# Patient Record
Sex: MALE | Race: OTHER | Employment: FULL TIME | URBAN - METROPOLITAN AREA
[De-identification: names, ages, dates, MRNs, and addresses within clinical notes are randomized per-mention and may not be internally consistent; named-entity substitution may affect disease eponyms.]

---

## 2017-02-16 ENCOUNTER — TELEPHONE (OUTPATIENT)
Dept: NEUROLOGY | Age: 27
End: 2017-02-16

## 2017-02-16 NOTE — TELEPHONE ENCOUNTER
Pt would like to stay on his current medication (keppra) instead of switching to a different one. They want to know if pt can skip his appt this months and reschedule it for august. Go back to 1 year appts vs 6 mo appts. Please call to advise.

## 2017-05-18 ENCOUNTER — TELEPHONE (OUTPATIENT)
Dept: NEUROLOGY | Age: 27
End: 2017-05-18

## 2017-05-18 NOTE — TELEPHONE ENCOUNTER
Patient's mother wanted to call with concerns regarding the appointment scheduling process. Mrs Viviane Deluna reports that it took over 20 mins on the phone to make an appointment and the  continually asked her what the appointment was regarding, even after being told several times. Patient's mother is concerned about the quality of care in the practice, if this is an indication. Mrs Viviane Deluna was reassured that we are aware of why the patient is seen by Dr Denise Portillo, and the care will be as high quality as always. Dr Denise Portillo and  made aware of concerns.   frankie

## 2017-05-18 NOTE — TELEPHONE ENCOUNTER
----- Message from Panchito Cruz sent at 5/18/2017 12:06 PM EDT -----  Regarding: Dr. Kevyn Koenig  Pt's mother(Elizabeth Simmons) would like call back to discuss son's care.  Best contact number [phone]

## 2017-08-16 RX ORDER — LEVETIRACETAM 1000 MG/1
1000 TABLET ORAL 2 TIMES DAILY
Qty: 60 TAB | Refills: 1 | Status: SHIPPED | OUTPATIENT
Start: 2017-08-16 | End: 2021-12-09

## 2020-07-30 ENCOUNTER — HOSPITAL ENCOUNTER (OUTPATIENT)
Dept: LAB | Age: 30
Discharge: HOME | End: 2020-07-30
Payer: COMMERCIAL

## 2020-07-30 DIAGNOSIS — U07.1 ASYMPTOMATIC COVID-19 VIRUS INFECTION: ICD-10-CM

## 2020-07-30 PROCEDURE — 87635 SARS-COV-2 COVID-19 AMP PRB: CPT

## 2020-07-31 LAB — SARS-COV-2, COV2NT: NOT DETECTED

## 2020-08-06 NOTE — PROGRESS NOTES
2:31 PM  Spoke with patient to verify time of arrival (10:30) and the reiterate that the patient should remain NPO after midnight.

## 2020-08-07 ENCOUNTER — HOSPITAL ENCOUNTER (OUTPATIENT)
Dept: INTERVENTIONAL RADIOLOGY/VASCULAR | Age: 30
Discharge: HOME | End: 2020-08-07
Attending: UROLOGY | Admitting: STUDENT IN AN ORGANIZED HEALTH CARE EDUCATION/TRAINING PROGRAM
Payer: COMMERCIAL

## 2020-08-07 VITALS
WEIGHT: 213.5 LBS | DIASTOLIC BLOOD PRESSURE: 68 MMHG | TEMPERATURE: 98.5 F | SYSTOLIC BLOOD PRESSURE: 121 MMHG | HEIGHT: 74 IN | OXYGEN SATURATION: 99 % | RESPIRATION RATE: 8 BRPM | BODY MASS INDEX: 27.4 KG/M2 | HEART RATE: 70 BPM

## 2020-08-07 DIAGNOSIS — N35.014 POST-TRAUMATIC MALE URETHRAL STRICTURE: ICD-10-CM

## 2020-08-07 PROCEDURE — 99152 MOD SED SAME PHYS/QHP 5/>YRS: CPT

## 2020-08-07 PROCEDURE — 77002 NEEDLE LOCALIZATION BY XRAY: CPT

## 2020-08-07 PROCEDURE — 77030005518 HC CATH URETH FOL 2W BARD -B

## 2020-08-07 PROCEDURE — 76942 ECHO GUIDE FOR BIOPSY: CPT

## 2020-08-07 PROCEDURE — 77030040831 HC BAG URINE DRNG MDII -A

## 2020-08-07 PROCEDURE — 74011250636 HC RX REV CODE- 250/636: Performed by: STUDENT IN AN ORGANIZED HEALTH CARE EDUCATION/TRAINING PROGRAM

## 2020-08-07 PROCEDURE — C1894 INTRO/SHEATH, NON-LASER: HCPCS

## 2020-08-07 PROCEDURE — 74011000250 HC RX REV CODE- 250: Performed by: STUDENT IN AN ORGANIZED HEALTH CARE EDUCATION/TRAINING PROGRAM

## 2020-08-07 PROCEDURE — C1892 INTRO/SHEATH,FIXED,PEEL-AWAY: HCPCS

## 2020-08-07 PROCEDURE — 77030011230 HC DIL VESL COON COOK -B

## 2020-08-07 PROCEDURE — 77030011229 HC DIL VESL COON COOK -A

## 2020-08-07 PROCEDURE — C1769 GUIDE WIRE: HCPCS

## 2020-08-07 RX ORDER — OXYBUTYNIN CHLORIDE 5 MG/1
5 TABLET ORAL 3 TIMES DAILY
COMMUNITY
End: 2020-12-29 | Stop reason: ALTCHOICE

## 2020-08-07 RX ORDER — VANCOMYCIN/0.9 % SOD CHLORIDE 1.5G/250ML
1500 PLASTIC BAG, INJECTION (ML) INTRAVENOUS ONCE
Status: COMPLETED | OUTPATIENT
Start: 2020-08-07 | End: 2020-08-07

## 2020-08-07 RX ORDER — CLINDAMYCIN PHOSPHATE 900 MG/50ML
900 INJECTION, SOLUTION INTRAVENOUS ONCE
Status: DISCONTINUED | OUTPATIENT
Start: 2020-08-07 | End: 2020-08-07

## 2020-08-07 RX ORDER — HYDROMORPHONE HYDROCHLORIDE 1 MG/ML
0.5 INJECTION, SOLUTION INTRAMUSCULAR; INTRAVENOUS; SUBCUTANEOUS ONCE
Status: COMPLETED | OUTPATIENT
Start: 2020-08-07 | End: 2020-08-07

## 2020-08-07 RX ORDER — LIDOCAINE HYDROCHLORIDE 10 MG/ML
10 INJECTION INFILTRATION; PERINEURAL
Status: DISCONTINUED | OUTPATIENT
Start: 2020-08-07 | End: 2020-08-07 | Stop reason: HOSPADM

## 2020-08-07 RX ORDER — MIDAZOLAM HYDROCHLORIDE 1 MG/ML
.5-5 INJECTION, SOLUTION INTRAMUSCULAR; INTRAVENOUS
Status: DISCONTINUED | OUTPATIENT
Start: 2020-08-07 | End: 2020-08-07 | Stop reason: HOSPADM

## 2020-08-07 RX ADMIN — VANCOMYCIN HYDROCHLORIDE 1500 MG: 10 INJECTION, POWDER, LYOPHILIZED, FOR SOLUTION INTRAVENOUS at 13:00

## 2020-08-07 RX ADMIN — HYDROMORPHONE HYDROCHLORIDE 0.25 MG: 1 INJECTION, SOLUTION INTRAMUSCULAR; INTRAVENOUS; SUBCUTANEOUS at 13:02

## 2020-08-07 RX ADMIN — MIDAZOLAM HYDROCHLORIDE 1 MG: 2 INJECTION, SOLUTION INTRAMUSCULAR; INTRAVENOUS at 13:03

## 2020-08-07 RX ADMIN — LIDOCAINE HYDROCHLORIDE 10 ML: 10 INJECTION, SOLUTION INFILTRATION; PERINEURAL at 13:03

## 2020-08-07 NOTE — PROGRESS NOTES
TRANSFER - IN REPORT:    Verbal report received from [facility] (name) on [employer].  being received from [facility] (unit) for ordered procedure      Report consisted of patients Situation, Background, Assessment and   Recommendations(SBAR). Information from the following report(s) SBAR was reviewed with the receiving nurse. Opportunity for questions and clarification was provided. Assessment completed upon patients arrival to unit and care assumed.

## 2020-08-07 NOTE — PROGRESS NOTES
12:43 PM  TRANSFER - OUT REPORT:    Verbal report given to Boo Fontanez RN(name) on [employer].  being transferred to [address] [place] [address] for ordered procedure       Report consisted of patients Situation, Background, Assessment and   Recommendations(SBAR). Information from the following report(s) SBAR was reviewed with the receiving nurse. Lines:   Peripheral IV 08/07/20 Left Hand (Active)        Opportunity for questions and clarification was provided.       Patient transported with:   Registered Nurse  Tech

## 2020-08-07 NOTE — PROGRESS NOTES
1323: TRANSFER - IN REPORT:    Verbal report received from [facility]ed[facility] (name) on [employer].  being received from [facility] (unit) for routine progression of care      Report consisted of patients Situation, Background, Assessment and   Recommendations(SBAR). Information from the following report(s) Procedure Summary and MAR was reviewed with the receiving nurse. Opportunity for questions and clarification was provided. Assessment completed upon patients arrival to unit and care assumed. Patient alert and oriented. Denies pain at this time. Suprapubic catheter dressing clean, dry, and intact. Urine light pink. No clots. Drainage bag hanging below patient's bladder. Will continue to monitor. 1435: 25cc of pink urine out of suprapubic catheter. Patient does not meet parameter yet to remove de leon catheter. Will recheck urine output in an hour. 1550: 275cc of pink urine out of suprapubic catheter. De Leon catheter removed. Patient tolerated catheter removal well.     1600: Discharge instructions reviewed with patient. Time given to ask questions or express concerns. Patient's mother was concerned with why patient suprapubic catheter was clamped off. Bipin Bolaños RN explained to mother that Dr. Sudhakar Rojas had instructed nurse to clamp suprapubic catheter off once patient's de leon catheter was removed in order for patient to try to void on his own and if patient needed to use the suprapubic to void after failing to void through through urethra, then he should hook up catheter bag that was supplied to him. Patient was aware of these instructions and understood them when Dr. Sudhakar Rojas was at bedside. Patient's mother was still confused on why de leon was removed and appeared to be upset. Josiahkike Pramod reached out to [facility] who had the patient earlier and inquired if she had received different orders or instructions from patient's doctor at [facility].  Mariann John did not and tried to explain Dr. Carina De La Fuente instructions again. Rohith Jones offered to contact patient's doctor at [facility] but patient's mother declined. Patient's suprapubic surgical site is dry and intact with no signs of hematoma. Removed peripheral line and telemetry. V/S stable. Patient collected all belongings. Patient discharged to mother via wheelchair.

## 2020-08-07 NOTE — PROCEDURES
Interventional Radiology  Procedure Note        8/7/2020 1:16 PM    Patient: Avis Trevino. Informed consent obtained    Diagnosis: Urethral stricture    Procedure(s): Suprapubic catheter placement, 16Fr Lopez catheter    Specimens removed:   Indwelling Lopez catheter to be removed in recovery area    Complications: None    Primary Physician: Becky Suggs MD    Recomendations: N/A    Discharge Disposition: Stable; to recovery area then home    Full dictated report to follow    Becky Suggs MD  Interventional Radiology  [facility]  1:16 PM, 8/7/2020

## 2020-08-07 NOTE — PROGRESS NOTES
TRANSFER - OUT REPORT:    Verbal report given to Darya Nino RN (name) on [employer]. being transferred to [facility] (unit) for routine progression of care       Report consisted of patient's Situation, Background, Assessment and   Recommendations(SBAR). Information from the following report(s) SBAR was reviewed with the receiving nurse. Opportunity for questions and clarification was provided.       Patient transported with:   Monitor

## 2020-08-07 NOTE — H&P
Radiology History and Physical    Patient: Denzel Hanson. 34 y.o. male       Chief Complaint: Uretheral stricture    History of Present Illness: 34year old male who suffered pelvic fractures with urethral trauma in May 2020. Has developed a urethral stricture treated with indwelling Lopez catheter. Plan for Lopez to be removed soon, and plan for suprapubic catheter to be placed in order to decompress bladder if there is recurrent urethral obstruction. Denies shortness of breath, cough, fever, chest pain, abdominal pain.     History:    Past Medical History:   Diagnosis Date    Kidney stone     Seizure ([address]     [facility]ures [facility]     White coat hypertension      Family History   Problem Relation Age of Onset    Migraines Mother      Social History     Socioeconomic History    Marital status: SINGLE     Spouse name: Not on file    Number of children: Not on file    Years of education: Not on file    Highest education level: Not on file   Occupational History    Not on file   Social Needs    Financial resource strain: Not on file    Food insecurity     Worry: Not on file     Inability: Not on file    Transportation needs     Medical: Not on file     Non-medical: Not on file   Tobacco Use    Smoking status: Never Smoker   Substance and Sexual Activity    Alcohol use: No     Comment: socially    Drug use: No    Sexual activity: Not on file   Lifestyle    Physical activity     Days per week: Not on file     Minutes per session: Not on file    Stress: Not on file   Relationships    Social connections     Talks on phone: Not on file     Gets together: Not on file     Attends [employer] service: Not on file     Active member of club or organization: Not on file     Attends meetings of clubs or organizations: Not on file     Relationship status: Not on file    Intimate partner violence     Fear of current or ex partner: Not on file     Emotionally abused: Not on file     Physically abused: Not on file Forced sexual activity: Not on file   Other Topics Concern    Not on file   Social History Narrative    ** Merged History Encounter **            Allergies: Allergies   Allergen Reactions    Fentanyl Itching     Patient stated he also became flushed all over    Pcn [Penicillins] Unknown (comments)     unk    Penicillin G Unknown (comments)       Current Medications:  Current Facility-Administered Medications   Medication Dose Route Frequency    HYDROmorphone (DILAUDID) syringe 0.5 mg  0.5 mg IntraVENous ONCE    midazolam (VERSED) injection 0.5-5 mg  0.5-5 mg IntraVENous Multiple    lidocaine (XYLOCAINE) 10 mg/mL (1 %) injection 10 mL  10 mL SubCUTAneous Multiple    clindamycin (CLEOCIN) 900mg NS 50mL IVPB (premix)  900 mg IntraVENous ONCE        Physical Exam:  Blood pressure 154/87, pulse 72, temperature 98.5 °F (36.9 °C), resp. rate 14, height 6' 2\" (1.88 m), weight 96.8 kg (213 lb 8 oz), SpO2 99 %. GENERAL: alert, cooperative, no distress, appears stated age,   LUNG: Nonlabored respiration  HEART: regular rate and rhythm, R radial & R DP pulse intact  EXT: No edema BLEs  ABD: Nontender, nondistended  No suprapubic tenderness    Alerts:      Laboratory:    No results for input(s): HGB, HCT, WBC, PLT, INR, BUN, CREA, K, CRCLT, HGBEXT, HCTEXT, PLTEXT, INREXT in the last 72 hours. No lab exists for component: PTT, PT      Plan of Care/Planned Procedure:  Risks, benefits, and alternatives reviewed with patient and he agrees to proceed with the procedure. Suprapubic catheter    Deemed appropriate for moderate sedation with versed and dilaudid. No fentanyl due to allergy.     Alan Solis MD  Interventional Radiology  [facility]  12:11 PM, 8/7/2020

## 2020-08-07 NOTE — DISCHARGE INSTRUCTIONS
Patient Education        Suprapubic Catheter Care: Care Instructions  Your Care Instructions  A suprapubic catheter is a thin tube placed into your bladder just above the pubic bone. The tube allows urine to drain out of your bladder. The urine collects in a bag attached to the tube. The bag is usually attached to your leg. Sometimes the catheter tube has a valve that lets you drain the urine into the toilet or other container. You may need a suprapubic catheter if you have nerve damage, a problem with your urinary tract, or a disease that weakens your muscles. Having a catheter for a long time increases the risk of getting a urinary tract infection. So catheter care focuses on preventing infection. Follow-up care is a key part of your treatment and safety. Be sure to make and go to all appointments, and call your doctor if you are having problems. It's also a good idea to know your test results and keep a list of the medicines you take. How can you care for yourself at home? · Wash your hands before you handle the catheter. · Clean the area around the catheter with soap and water at least one time every day. Wash the area with soap and water after every bowel movement. · Keep the drainage bag lower than your bladder to keep urine from backing up. · Clean the bag every day after removing it from the catheter. Use another container while you clean the bag. To clean the bag, fill it with 2 parts vinegar to 3 parts water and let it stand for 20 minutes. Then empty it out, and let it air dry. · Empty the drainage bag when it is full or at least every 8 hours. When should you call for help? Call your doctor now or seek immediate medical care if:  · Your catheter becomes blocked and urine does not collect in the drainage bag. · Your catheter leaks. · You have blood or pus in your urine. · You have pain in your back just below your rib cage. This is called flank pain.   · You have a fever, chills, or body aches.  · You have groin or belly pain. · Your urine is cloudy or smells bad. · You have pain, increasing redness, or bleeding around the catheter. · You have swelling around the catheter or in your belly. Watch closely for changes in your health, and be sure to contact your doctor if you have any problems. Where can you learn more? Go to http://www.gray.com/  Enter D213 in the search box to learn more about \"Suprapubic Catheter Care: Care Instructions. \"  Current as of: August 22, 2019               Content Version: 12.5  © 3705-2523 [employer]. Care instructions adapted under license by [employer] (which disclaims liability or warranty for this information). If you have questions about a medical condition or this instruction, always ask your healthcare professional. [address] any warranty or liability for your use of this information.

## 2020-08-07 NOTE — PROGRESS NOTES
10:54 AM    Patient arrived. ID and allergies verified verbally with patient. Pt voices understanding of procedure to be performed. Consent obtained. Pt prepped for procedure.        175ml drained from patient's de leon catheter  Catheter left unclamped per MD

## 2020-11-17 PROBLEM — G89.11 ACUTE PAIN DUE TO INJURY: Status: ACTIVE | Noted: 2020-05-24
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
## 2020-11-17 PROBLEM — S37.30XA URETHRAL INJURY: Status: ACTIVE | Noted: 2020-05-24

## 2020-11-17 PROBLEM — S32.10XA CLOSED FRACTURE OF SACRUM (HCC): Status: ACTIVE | Noted: 2020-05-24

## 2020-11-17 PROBLEM — S22.31XA FRACTURE OF RIB OF RIGHT SIDE: Status: ACTIVE | Noted: 2020-05-24
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
## 2020-11-17 PROBLEM — S32.9XXA PELVIS FRACTURE (HCC): Status: ACTIVE | Noted: 2020-05-24

## 2020-11-17 PROBLEM — T14.8XXA HEMATOMA: Status: ACTIVE | Noted: 2020-05-24

## 2020-11-17 PROBLEM — Z98.890 S/P CYSTOSCOPY: Status: ACTIVE | Noted: 2020-05-25

## 2021-02-16 ENCOUNTER — TRANSCRIBE ORDER (OUTPATIENT)
Dept: SCHEDULING | Age: 31
End: 2021-02-16

## 2021-02-16 ENCOUNTER — HOSPITAL ENCOUNTER (OUTPATIENT)
Dept: ULTRASOUND IMAGING | Age: 31
Discharge: HOME | End: 2021-02-16
Attending: NURSE PRACTITIONER
Payer: COMMERCIAL

## 2021-02-16 DIAGNOSIS — M79.604 PAIN IN RIGHT LEG: ICD-10-CM

## 2021-02-16 DIAGNOSIS — M79.604 PAIN IN RIGHT LEG: Primary | ICD-10-CM

## 2021-02-16 PROCEDURE — 93971 EXTREMITY STUDY: CPT

## 2021-06-29 PROBLEM — N35.919 URETHRAL STRICTURE: Status: ACTIVE | Noted: 2020-12-09

## 2021-12-08 ENCOUNTER — APPOINTMENT (OUTPATIENT)
Dept: GENERAL RADIOLOGY | Age: 31
DRG: 698 | End: 2021-12-08
Attending: EMERGENCY MEDICINE
Payer: COMMERCIAL

## 2021-12-08 ENCOUNTER — HOSPITAL ENCOUNTER (INPATIENT)
Age: 31
LOS: 1 days | Discharge: HOME | DRG: 698 | End: 2021-12-10
Attending: EMERGENCY MEDICINE | Admitting: INTERNAL MEDICINE
Payer: COMMERCIAL

## 2021-12-08 DIAGNOSIS — A41.9 SEVERE SEPSIS (HCC): ICD-10-CM

## 2021-12-08 DIAGNOSIS — R65.20 SEVERE SEPSIS (HCC): ICD-10-CM

## 2021-12-08 DIAGNOSIS — N12 PYELONEPHRITIS OF LEFT KIDNEY: ICD-10-CM

## 2021-12-08 DIAGNOSIS — R50.9 ACUTE FEBRILE ILLNESS: Primary | ICD-10-CM

## 2021-12-08 DIAGNOSIS — E87.6 HYPOKALEMIA: ICD-10-CM

## 2021-12-08 LAB
ALBUMIN SERPL-MCNC: 3.6 G/DL (ref 3.5–5)
ALBUMIN/GLOB SERPL: 0.9 {RATIO} (ref 1.1–2.2)
ALP SERPL-CCNC: 83 U/L (ref 45–117)
ALT SERPL-CCNC: 23 U/L (ref 12–78)
ANION GAP SERPL CALC-SCNC: 9 MMOL/L (ref 5–15)
AST SERPL-CCNC: 11 U/L (ref 15–37)
BILIRUB SERPL-MCNC: 0.9 MG/DL (ref 0.2–1)
BUN SERPL-MCNC: 11 MG/DL (ref 6–20)
BUN/CREAT SERPL: 8 (ref 12–20)
CALCIUM SERPL-MCNC: 8.8 MG/DL (ref 8.5–10.1)
CHLORIDE SERPL-SCNC: 105 MMOL/L (ref 97–108)
CO2 SERPL-SCNC: 20 MMOL/L (ref 21–32)
COMMENT, HOLDF: NORMAL
COVID-19 RAPID TEST, COVR: NOT DETECTED
CREAT SERPL-MCNC: 1.33 MG/DL (ref 0.7–1.3)
FLUAV AG NPH QL IA: NEGATIVE
FLUBV AG NOSE QL IA: NEGATIVE
GLOBULIN SER CALC-MCNC: 4 G/DL (ref 2–4)
GLUCOSE SERPL-MCNC: 121 MG/DL (ref 65–100)
LACTATE BLD-SCNC: 0.7 MMOL/L (ref 0.4–2)
POTASSIUM SERPL-SCNC: 3.2 MMOL/L (ref 3.5–5.1)
PROT SERPL-MCNC: 7.6 G/DL (ref 6.4–8.2)
SAMPLES BEING HELD,HOLD: NORMAL
SODIUM SERPL-SCNC: 134 MMOL/L (ref 136–145)
SOURCE, COVRS: NORMAL

## 2021-12-08 PROCEDURE — 96374 THER/PROPH/DIAG INJ IV PUSH: CPT

## 2021-12-08 PROCEDURE — 87635 SARS-COV-2 COVID-19 AMP PRB: CPT

## 2021-12-08 PROCEDURE — 83605 ASSAY OF LACTIC ACID: CPT

## 2021-12-08 PROCEDURE — 80053 COMPREHEN METABOLIC PANEL: CPT

## 2021-12-08 PROCEDURE — 71046 X-RAY EXAM CHEST 2 VIEWS: CPT

## 2021-12-08 PROCEDURE — 85652 RBC SED RATE AUTOMATED: CPT

## 2021-12-08 PROCEDURE — 36415 COLL VENOUS BLD VENIPUNCTURE: CPT

## 2021-12-08 PROCEDURE — 84145 PROCALCITONIN (PCT): CPT

## 2021-12-08 PROCEDURE — 74011250637 HC RX REV CODE- 250/637: Performed by: EMERGENCY MEDICINE

## 2021-12-08 PROCEDURE — 99284 EMERGENCY DEPT VISIT MOD MDM: CPT

## 2021-12-08 PROCEDURE — 87086 URINE CULTURE/COLONY COUNT: CPT

## 2021-12-08 PROCEDURE — 87040 BLOOD CULTURE FOR BACTERIA: CPT

## 2021-12-08 PROCEDURE — 83735 ASSAY OF MAGNESIUM: CPT

## 2021-12-08 PROCEDURE — 81001 URINALYSIS AUTO W/SCOPE: CPT

## 2021-12-08 PROCEDURE — 74011000250 HC RX REV CODE- 250: Performed by: EMERGENCY MEDICINE

## 2021-12-08 PROCEDURE — 85025 COMPLETE CBC W/AUTO DIFF WBC: CPT

## 2021-12-08 PROCEDURE — 86140 C-REACTIVE PROTEIN: CPT

## 2021-12-08 PROCEDURE — 96361 HYDRATE IV INFUSION ADD-ON: CPT

## 2021-12-08 PROCEDURE — 87804 INFLUENZA ASSAY W/OPTIC: CPT

## 2021-12-08 PROCEDURE — 74011250636 HC RX REV CODE- 250/636: Performed by: EMERGENCY MEDICINE

## 2021-12-08 RX ORDER — KETOROLAC TROMETHAMINE 30 MG/ML
30 INJECTION, SOLUTION INTRAMUSCULAR; INTRAVENOUS
Status: COMPLETED | OUTPATIENT
Start: 2021-12-08 | End: 2021-12-08

## 2021-12-08 RX ORDER — ACETAMINOPHEN 500 MG
1000 TABLET ORAL
Status: COMPLETED | OUTPATIENT
Start: 2021-12-08 | End: 2021-12-08

## 2021-12-08 RX ADMIN — ACETAMINOPHEN 1000 MG: 500 TABLET ORAL at 22:48

## 2021-12-08 RX ADMIN — SODIUM CHLORIDE 1000 ML: 9 INJECTION, SOLUTION INTRAVENOUS at 22:46

## 2021-12-08 RX ADMIN — KETOROLAC TROMETHAMINE 30 MG: 30 INJECTION, SOLUTION INTRAMUSCULAR; INTRAVENOUS at 22:48

## 2021-12-08 RX ADMIN — CEFTRIAXONE 1 G: 1 INJECTION, POWDER, FOR SOLUTION INTRAMUSCULAR; INTRAVENOUS at 22:52

## 2021-12-09 ENCOUNTER — APPOINTMENT (OUTPATIENT)
Dept: CT IMAGING | Age: 31
DRG: 698 | End: 2021-12-09
Attending: EMERGENCY MEDICINE
Payer: COMMERCIAL

## 2021-12-09 PROBLEM — S37.30XA URETHRAL INJURY: Status: RESOLVED | Noted: 2020-05-24 | Resolved: 2021-12-09

## 2021-12-09 PROBLEM — D72.829 LEUKOCYTOSIS: Status: ACTIVE | Noted: 2021-12-09

## 2021-12-09 PROBLEM — T14.8XXA HEMATOMA: Status: RESOLVED | Noted: 2020-05-24 | Resolved: 2021-12-09

## 2021-12-09 PROBLEM — S32.9XXA PELVIS FRACTURE (HCC): Status: RESOLVED | Noted: 2020-05-24 | Resolved: 2021-12-09

## 2021-12-09 PROBLEM — R00.0 SINUS TACHYCARDIA: Status: ACTIVE | Noted: 2021-12-09
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
## 2021-12-09 PROBLEM — G89.11 ACUTE PAIN DUE TO INJURY: Status: RESOLVED | Noted: 2020-05-24 | Resolved: 2021-12-09
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
## 2021-12-09 PROBLEM — S22.31XA FRACTURE OF RIB OF RIGHT SIDE: Status: RESOLVED | Noted: 2020-05-24 | Resolved: 2021-12-09

## 2021-12-09 PROBLEM — S32.10XA CLOSED FRACTURE OF SACRUM (HCC): Status: RESOLVED | Noted: 2020-05-24 | Resolved: 2021-12-09

## 2021-12-09 PROBLEM — R50.9 FEVER: Status: ACTIVE | Noted: 2021-12-09

## 2021-12-09 PROBLEM — N35.919 URETHRAL STRICTURE: Status: RESOLVED | Noted: 2020-12-09 | Resolved: 2021-12-09

## 2021-12-09 PROBLEM — A41.9 SEPSIS (HCC): Status: ACTIVE | Noted: 2021-12-09

## 2021-12-09 LAB
ALBUMIN SERPL-MCNC: 2.8 G/DL (ref 3.5–5)
ALBUMIN/GLOB SERPL: 0.8 {RATIO} (ref 1.1–2.2)
ALP SERPL-CCNC: 68 U/L (ref 45–117)
ALT SERPL-CCNC: 18 U/L (ref 12–78)
ANION GAP SERPL CALC-SCNC: 8 MMOL/L (ref 5–15)
APPEARANCE UR: ABNORMAL
AST SERPL-CCNC: 11 U/L (ref 15–37)
BACTERIA URNS QL MICRO: NEGATIVE /HPF
BASOPHILS # BLD: 0 K/UL (ref 0–0.1)
BASOPHILS # BLD: 0 K/UL (ref 0–0.1)
BASOPHILS NFR BLD: 0 % (ref 0–1)
BASOPHILS NFR BLD: 0 % (ref 0–1)
BILIRUB SERPL-MCNC: 0.5 MG/DL (ref 0.2–1)
BILIRUB UR QL: NEGATIVE
BUN SERPL-MCNC: 11 MG/DL (ref 6–20)
BUN/CREAT SERPL: 13 (ref 12–20)
C DIFF TOX GENS STL QL NAA+PROBE: POSITIVE
CALCIUM SERPL-MCNC: 8.3 MG/DL (ref 8.5–10.1)
CHLORIDE SERPL-SCNC: 112 MMOL/L (ref 97–108)
CO2 SERPL-SCNC: 19 MMOL/L (ref 21–32)
COLOR UR: ABNORMAL
CREAT SERPL-MCNC: 0.85 MG/DL (ref 0.7–1.3)
CRP SERPL-MCNC: 12.1 MG/DL (ref 0–0.6)
DIFFERENTIAL METHOD BLD: ABNORMAL
DIFFERENTIAL METHOD BLD: ABNORMAL
EOSINOPHIL # BLD: 0 K/UL (ref 0–0.4)
EOSINOPHIL # BLD: 0 K/UL (ref 0–0.4)
EOSINOPHIL NFR BLD: 0 % (ref 0–7)
EOSINOPHIL NFR BLD: 0 % (ref 0–7)
EPITH CASTS URNS QL MICRO: ABNORMAL /LPF
ERYTHROCYTE [DISTWIDTH] IN BLOOD BY AUTOMATED COUNT: 12.3 % (ref 11.5–14.5)
ERYTHROCYTE [DISTWIDTH] IN BLOOD BY AUTOMATED COUNT: 12.4 % (ref 11.5–14.5)
ERYTHROCYTE [SEDIMENTATION RATE] IN BLOOD: 7 MM/HR (ref 0–15)
GLOBULIN SER CALC-MCNC: 3.6 G/DL (ref 2–4)
GLUCOSE SERPL-MCNC: 99 MG/DL (ref 65–100)
GLUCOSE UR STRIP.AUTO-MCNC: NEGATIVE MG/DL
HCT VFR BLD AUTO: 41.6 % (ref 36.6–50.3)
HCT VFR BLD AUTO: 46.3 % (ref 36.6–50.3)
HGB BLD-MCNC: 14.4 G/DL (ref 12.1–17)
HGB BLD-MCNC: 16.2 G/DL (ref 12.1–17)
HGB UR QL STRIP: ABNORMAL
HYALINE CASTS URNS QL MICRO: ABNORMAL /LPF (ref 0–5)
IMM GRANULOCYTES # BLD AUTO: 0 K/UL (ref 0–0.04)
IMM GRANULOCYTES # BLD AUTO: 0.1 K/UL (ref 0–0.04)
IMM GRANULOCYTES NFR BLD AUTO: 0 % (ref 0–0.5)
IMM GRANULOCYTES NFR BLD AUTO: 1 % (ref 0–0.5)
INTERPRETATION: ABNORMAL
KETONES UR QL STRIP.AUTO: ABNORMAL MG/DL
LEUKOCYTE ESTERASE UR QL STRIP.AUTO: ABNORMAL
LYMPHOCYTES # BLD: 1.4 K/UL (ref 0.8–3.5)
LYMPHOCYTES # BLD: 1.5 K/UL (ref 0.8–3.5)
LYMPHOCYTES NFR BLD: 11 % (ref 12–49)
LYMPHOCYTES NFR BLD: 8 % (ref 12–49)
MAGNESIUM SERPL-MCNC: 1.8 MG/DL (ref 1.6–2.4)
MCH RBC QN AUTO: 29.8 PG (ref 26–34)
MCH RBC QN AUTO: 30.4 PG (ref 26–34)
MCHC RBC AUTO-ENTMCNC: 34.6 G/DL (ref 30–36.5)
MCHC RBC AUTO-ENTMCNC: 35 G/DL (ref 30–36.5)
MCV RBC AUTO: 85.3 FL (ref 80–99)
MCV RBC AUTO: 87.9 FL (ref 80–99)
MONOCYTES # BLD: 2.2 K/UL (ref 0–1)
MONOCYTES # BLD: 2.5 K/UL (ref 0–1)
MONOCYTES NFR BLD: 14 % (ref 5–13)
MONOCYTES NFR BLD: 16 % (ref 5–13)
NEUTS SEG # BLD: 14.2 K/UL (ref 1.8–8)
NEUTS SEG # BLD: 9.8 K/UL (ref 1.8–8)
NEUTS SEG NFR BLD: 72 % (ref 32–75)
NEUTS SEG NFR BLD: 78 % (ref 32–75)
NITRITE UR QL STRIP.AUTO: NEGATIVE
NRBC # BLD: 0 K/UL (ref 0–0.01)
NRBC # BLD: 0 K/UL (ref 0–0.01)
NRBC BLD-RTO: 0 PER 100 WBC
NRBC BLD-RTO: 0 PER 100 WBC
OTHER,OTHU: ABNORMAL
PCR REFLEX: ABNORMAL
PH UR STRIP: 6 [PH] (ref 5–8)
PLATELET # BLD AUTO: 202 K/UL (ref 150–400)
PLATELET # BLD AUTO: 246 K/UL (ref 150–400)
PMV BLD AUTO: 9.3 FL (ref 8.9–12.9)
PMV BLD AUTO: 9.8 FL (ref 8.9–12.9)
POTASSIUM SERPL-SCNC: 3.6 MMOL/L (ref 3.5–5.1)
PROCALCITONIN SERPL-MCNC: 0.28 NG/ML
PROT SERPL-MCNC: 6.4 G/DL (ref 6.4–8.2)
PROT UR STRIP-MCNC: 300 MG/DL
RBC # BLD AUTO: 4.73 M/UL (ref 4.1–5.7)
RBC # BLD AUTO: 5.43 M/UL (ref 4.1–5.7)
RBC #/AREA URNS HPF: >100 /HPF (ref 0–5)
RBC MORPH BLD: ABNORMAL
RBC MORPH BLD: ABNORMAL
SODIUM SERPL-SCNC: 139 MMOL/L (ref 136–145)
SP GR UR REFRACTOMETRY: 1.02 (ref 1–1.03)
UA: UC IF INDICATED,UAUC: ABNORMAL
UROBILINOGEN UR QL STRIP.AUTO: 0.2 EU/DL (ref 0.2–1)
WBC # BLD AUTO: 13.6 K/UL (ref 4.1–11.1)
WBC # BLD AUTO: 18.1 K/UL (ref 4.1–11.1)
WBC URNS QL MICRO: ABNORMAL /HPF (ref 0–4)

## 2021-12-09 PROCEDURE — 74177 CT ABD & PELVIS W/CONTRAST: CPT

## 2021-12-09 PROCEDURE — 80053 COMPREHEN METABOLIC PANEL: CPT

## 2021-12-09 PROCEDURE — 96361 HYDRATE IV INFUSION ADD-ON: CPT

## 2021-12-09 PROCEDURE — 74011250637 HC RX REV CODE- 250/637: Performed by: INTERNAL MEDICINE

## 2021-12-09 PROCEDURE — 85025 COMPLETE CBC W/AUTO DIFF WBC: CPT

## 2021-12-09 PROCEDURE — 74011250636 HC RX REV CODE- 250/636: Performed by: EMERGENCY MEDICINE

## 2021-12-09 PROCEDURE — 65270000029 HC RM PRIVATE

## 2021-12-09 PROCEDURE — 87493 C DIFF AMPLIFIED PROBE: CPT

## 2021-12-09 PROCEDURE — 36415 COLL VENOUS BLD VENIPUNCTURE: CPT

## 2021-12-09 PROCEDURE — 74011250636 HC RX REV CODE- 250/636: Performed by: INTERNAL MEDICINE

## 2021-12-09 PROCEDURE — 74011000250 HC RX REV CODE- 250: Performed by: INTERNAL MEDICINE

## 2021-12-09 PROCEDURE — 74011000636 HC RX REV CODE- 636: Performed by: RADIOLOGY

## 2021-12-09 PROCEDURE — 74011250637 HC RX REV CODE- 250/637: Performed by: EMERGENCY MEDICINE

## 2021-12-09 PROCEDURE — 87324 CLOSTRIDIUM AG IA: CPT

## 2021-12-09 RX ORDER — ONDANSETRON HYDROCHLORIDE 8 MG/1
8 TABLET, FILM COATED ORAL
COMMUNITY
End: 2021-12-09

## 2021-12-09 RX ORDER — ACETAMINOPHEN 500 MG
1000 TABLET ORAL
COMMUNITY

## 2021-12-09 RX ORDER — ZONISAMIDE 100 MG/1
200 CAPSULE ORAL DAILY
COMMUNITY

## 2021-12-09 RX ORDER — ACETAMINOPHEN 650 MG/1
650 SUPPOSITORY RECTAL
Status: DISCONTINUED | OUTPATIENT
Start: 2021-12-09 | End: 2021-12-10 | Stop reason: HOSPADM

## 2021-12-09 RX ORDER — TADALAFIL 5 MG/1
5 TABLET ORAL DAILY
COMMUNITY
End: 2022-01-31 | Stop reason: SDUPTHER

## 2021-12-09 RX ORDER — SODIUM CHLORIDE, SODIUM LACTATE, POTASSIUM CHLORIDE, CALCIUM CHLORIDE 600; 310; 30; 20 MG/100ML; MG/100ML; MG/100ML; MG/100ML
125 INJECTION, SOLUTION INTRAVENOUS CONTINUOUS
Status: DISCONTINUED | OUTPATIENT
Start: 2021-12-09 | End: 2021-12-10 | Stop reason: HOSPADM

## 2021-12-09 RX ORDER — EPINEPHRINE 0.3 MG/.3ML
0.3 INJECTION SUBCUTANEOUS AS NEEDED
COMMUNITY

## 2021-12-09 RX ORDER — SODIUM CHLORIDE 0.9 % (FLUSH) 0.9 %
5-10 SYRINGE (ML) INJECTION AS NEEDED
Status: DISCONTINUED | OUTPATIENT
Start: 2021-12-09 | End: 2021-12-10 | Stop reason: HOSPADM

## 2021-12-09 RX ORDER — ONDANSETRON 2 MG/ML
4 INJECTION INTRAMUSCULAR; INTRAVENOUS
Status: DISCONTINUED | OUTPATIENT
Start: 2021-12-09 | End: 2021-12-10 | Stop reason: HOSPADM

## 2021-12-09 RX ORDER — LEVETIRACETAM 250 MG/1
750 TABLET ORAL EVERY 12 HOURS
Status: DISCONTINUED | OUTPATIENT
Start: 2021-12-09 | End: 2021-12-10 | Stop reason: HOSPADM

## 2021-12-09 RX ORDER — POTASSIUM CHLORIDE 750 MG/1
40 TABLET, FILM COATED, EXTENDED RELEASE ORAL
Status: COMPLETED | OUTPATIENT
Start: 2021-12-09 | End: 2021-12-09

## 2021-12-09 RX ORDER — ENOXAPARIN SODIUM 100 MG/ML
40 INJECTION SUBCUTANEOUS EVERY 24 HOURS
Status: DISCONTINUED | OUTPATIENT
Start: 2021-12-09 | End: 2021-12-10 | Stop reason: HOSPADM

## 2021-12-09 RX ORDER — SODIUM CHLORIDE 0.9 % (FLUSH) 0.9 %
5-40 SYRINGE (ML) INJECTION EVERY 8 HOURS
Status: DISCONTINUED | OUTPATIENT
Start: 2021-12-09 | End: 2021-12-10 | Stop reason: HOSPADM

## 2021-12-09 RX ORDER — ZONISAMIDE 100 MG/1
200 CAPSULE ORAL DAILY
Status: DISCONTINUED | OUTPATIENT
Start: 2021-12-09 | End: 2021-12-10 | Stop reason: HOSPADM

## 2021-12-09 RX ORDER — LEVETIRACETAM 750 MG/1
750 TABLET ORAL EVERY 12 HOURS
COMMUNITY

## 2021-12-09 RX ORDER — ACETAMINOPHEN 325 MG/1
650 TABLET ORAL
Status: DISCONTINUED | OUTPATIENT
Start: 2021-12-09 | End: 2021-12-10 | Stop reason: HOSPADM

## 2021-12-09 RX ORDER — POLYETHYLENE GLYCOL 3350 17 G/17G
17 POWDER, FOR SOLUTION ORAL DAILY PRN
Status: DISCONTINUED | OUTPATIENT
Start: 2021-12-09 | End: 2021-12-10 | Stop reason: HOSPADM

## 2021-12-09 RX ORDER — ZONISAMIDE 100 MG/1
100 CAPSULE ORAL EVERY EVENING
Status: DISCONTINUED | OUTPATIENT
Start: 2021-12-09 | End: 2021-12-10 | Stop reason: HOSPADM

## 2021-12-09 RX ORDER — LEVETIRACETAM 250 MG/1
1000 TABLET ORAL 2 TIMES DAILY
Status: DISCONTINUED | OUTPATIENT
Start: 2021-12-09 | End: 2021-12-09

## 2021-12-09 RX ORDER — VANCOMYCIN HYDROCHLORIDE 250 MG/5ML
125 POWDER, FOR SOLUTION ORAL EVERY 6 HOURS
Status: DISCONTINUED | OUTPATIENT
Start: 2021-12-09 | End: 2021-12-10 | Stop reason: HOSPADM

## 2021-12-09 RX ORDER — ZONISAMIDE 100 MG/1
100 CAPSULE ORAL EVERY EVENING
COMMUNITY

## 2021-12-09 RX ORDER — ONDANSETRON HYDROCHLORIDE 8 MG/1
8 TABLET, FILM COATED ORAL
COMMUNITY
Start: 2021-12-07 | End: 2021-12-14

## 2021-12-09 RX ORDER — SODIUM CHLORIDE 0.9 % (FLUSH) 0.9 %
5-40 SYRINGE (ML) INJECTION AS NEEDED
Status: DISCONTINUED | OUTPATIENT
Start: 2021-12-09 | End: 2021-12-10 | Stop reason: HOSPADM

## 2021-12-09 RX ORDER — KETOROLAC TROMETHAMINE 10 MG/1
10 TABLET, FILM COATED ORAL
COMMUNITY
Start: 2021-12-07 | End: 2021-12-14

## 2021-12-09 RX ORDER — CIPROFLOXACIN 500 MG/1
500 TABLET ORAL 2 TIMES DAILY
COMMUNITY
Start: 2021-12-07 | End: 2021-12-10

## 2021-12-09 RX ORDER — HYDROMORPHONE HYDROCHLORIDE 2 MG/1
1 TABLET ORAL
Status: DISCONTINUED | OUTPATIENT
Start: 2021-12-09 | End: 2021-12-10 | Stop reason: HOSPADM

## 2021-12-09 RX ORDER — NALOXONE HYDROCHLORIDE 0.4 MG/ML
0.4 INJECTION, SOLUTION INTRAMUSCULAR; INTRAVENOUS; SUBCUTANEOUS
Status: DISCONTINUED | OUTPATIENT
Start: 2021-12-09 | End: 2021-12-10 | Stop reason: HOSPADM

## 2021-12-09 RX ADMIN — SODIUM CHLORIDE, POTASSIUM CHLORIDE, SODIUM LACTATE AND CALCIUM CHLORIDE 125 ML/HR: 600; 310; 30; 20 INJECTION, SOLUTION INTRAVENOUS at 02:03

## 2021-12-09 RX ADMIN — LEVETIRACETAM 750 MG: 250 TABLET, FILM COATED ORAL at 19:47

## 2021-12-09 RX ADMIN — POTASSIUM CHLORIDE 40 MEQ: 750 TABLET, FILM COATED, EXTENDED RELEASE ORAL at 00:59

## 2021-12-09 RX ADMIN — VANCOMYCIN HYDROCHLORIDE 1250 MG: 1.25 INJECTION, POWDER, LYOPHILIZED, FOR SOLUTION INTRAVENOUS at 15:50

## 2021-12-09 RX ADMIN — WATER 2 G: 1 INJECTION INTRAMUSCULAR; INTRAVENOUS; SUBCUTANEOUS at 09:03

## 2021-12-09 RX ADMIN — Medication 10 ML: at 01:34

## 2021-12-09 RX ADMIN — Medication 10 ML: at 06:00

## 2021-12-09 RX ADMIN — Medication 10 ML: at 22:00

## 2021-12-09 RX ADMIN — IOPAMIDOL 100 ML: 755 INJECTION, SOLUTION INTRAVENOUS at 00:34

## 2021-12-09 RX ADMIN — ENOXAPARIN SODIUM 40 MG: 100 INJECTION SUBCUTANEOUS at 21:22

## 2021-12-09 RX ADMIN — SODIUM CHLORIDE, POTASSIUM CHLORIDE, SODIUM LACTATE AND CALCIUM CHLORIDE 125 ML/HR: 600; 310; 30; 20 INJECTION, SOLUTION INTRAVENOUS at 22:17

## 2021-12-09 RX ADMIN — SODIUM CHLORIDE 1000 ML: 9 INJECTION, SOLUTION INTRAVENOUS at 00:11

## 2021-12-09 RX ADMIN — VANCOMYCIN HYDROCHLORIDE 2250 MG: 10 INJECTION, POWDER, LYOPHILIZED, FOR SOLUTION INTRAVENOUS at 02:03

## 2021-12-09 RX ADMIN — VANCOMYCIN HYDROCHLORIDE 125 MG: 250 POWDER, FOR SOLUTION ORAL at 22:18

## 2021-12-09 RX ADMIN — ZONISAMIDE 200 MG: 100 CAPSULE ORAL at 11:00

## 2021-12-09 RX ADMIN — Medication 10 ML: at 15:51

## 2021-12-09 RX ADMIN — SODIUM CHLORIDE, POTASSIUM CHLORIDE, SODIUM LACTATE AND CALCIUM CHLORIDE 125 ML/HR: 600; 310; 30; 20 INJECTION, SOLUTION INTRAVENOUS at 12:27

## 2021-12-09 RX ADMIN — LEVETIRACETAM 750 MG: 250 TABLET, FILM COATED ORAL at 09:03

## 2021-12-09 RX ADMIN — Medication 1 CAPSULE: at 02:34

## 2021-12-09 RX ADMIN — ZONISAMIDE 100 MG: 100 CAPSULE ORAL at 19:47

## 2021-12-09 NOTE — PROGRESS NOTES
Bedside and Verbal shift change report given to Chuckia, [facility] (oncoming nurse) by Nate Russo RN (offgoing nurse). Report included the following information SBAR, Intake/Output, MAR and Recent Results.

## 2021-12-09 NOTE — H&P
[address]  [phone]    Hospitalist Admission Note      NAME:  Randye Lanes   :   1990   MRN:  [id number]     PCP:  Alida Perez NP     Date of Service/Time:  2021 6:12 AM         Assessment / Plan:       36 y.o. male with hx of seizure d/o, kidney stones s/p recent  procedure presenting w/ flank pain, admitted for urosepsis     Sepsis: lactate WNR. 2/2 Pyelonephritis likely related to recent  procedure for kidney stones. CT showed left nephroureteral stent in place. Increase dose of IV CTX to 2gm per day in case bacteremia. Low threshold to broaden abx. Follow cultures. Urology already consulted, awaiting their evaluation. IVF, IV Dilaudid PRN severe pain       Seizure: cont home AEDs      Code Status: FULL       ED notes, lab results, and imaging studies reviewed. Total time spent with patient: 50 Minutes   Time spent in the care of this patient included reviewing records, discussing with nursing, obtaining history and examining the patient, and discussing treatment plans, with >50% time spent counseling/coordinating care    Risk of deterioration: High                 Care Plan discussed with: ED provider, Patient, Nursing Staff and >50% of time spent in counseling and coordination of care    Discussed:  Care Plan and D/C Planning    Prophylaxis:  Lovenox    Disposition:  Home w/Family                 Subjective:     CHIEF COMPLAINT: flank pain     HISTORY OF PRESENT ILLNESS:     Mr. Kory Bañuelos is a 36 y.o. male w/ hx of seizure d/o, kidney stones s/p recent  procedure presenting w/ flank pain. Had kidney stone removal on Monday, stent also reportedly placed. Developed L flank pain the next day, severe, associated with weakness, fevers, and body aches. ED workup showed severe pyuria. CT a/p showed left pyelonephritis w/ left nephroureteral stent in place.     Mr. Kory Bañuelos is admitted for further evaluation and management. Past Medical History:   Diagnosis Date    ED (erectile dysfunction)     Flank pain     Hematoma     Hydronephrosis     Kidney stone     Pelvis fracture (HCC)     Seizure (HCC)     Seizures (HCC)     UPJ obstruction, acquired     Urethral injury     Urethral stricture     UTI (urinary tract infection)     White coat hypertension         Past Surgical History:   Procedure Laterality Date    HX OTHER SURGICAL      vein surgically removed off left kidney when he was 17 y/o    HX OTHER SURGICAL  05/24/2020    SD CYSTOURETHROSCOPY,URETER CATHETER, CYSTOSCOPY, WITH RETROGRADE PYELOGRAM. Janet Potts    IR ASP BLADDER SUPRA CATH  8/7/2020       Social History     Tobacco Use    Smoking status: Never Smoker    Smokeless tobacco: Never Used   Substance Use Topics    Alcohol use: No     Comment: socially        Family History   Problem Relation Age of Onset    Migraines Mother         Allergies   Allergen Reactions    Fentanyl Itching and Nausea and Vomiting     Patient stated he also became flushed all over    Penicillins Unknown (comments) and Hives     unk    Penicillin G Unknown (comments)    Morphine Nausea and Vomiting        Prior to Admission medications    Medication Sig Start Date End Date Taking? Authorizing Provider   tadalafiL (CIALIS) 5 mg tablet Take 1 Tablet by mouth daily as needed for Erectile Dysfunction. 9/9/21   Arti Small MD   tadalafiL (CIALIS) 20 mg tablet Take 1 Tablet by mouth as needed for Erectile Dysfunction. 8/9/21   Arti Small MD   zonisamide (ZONEGRAN) 50 mg capsule Take  by mouth daily. Provider, Historical   levETIRAcetam (KEPPRA) 1,000 mg tablet Take 1 Tab by mouth two (2) times a day.  8/16/17   Georgie Vargas NP       Review of Systems:  (bold if positive, if negative)    Gen:  ever, chills, fatigueEyes:  ENT:  CVS:  Pulm:  GI:  Abdominal pain, nausea, emesis,:  dysuriaMS:  PainSkin:  Psych:  Endo:  Hem:  Renal:  Neuro: Objective:      VITALS:    Vital signs reviewed; most recent are:    Visit Vitals  /64   Pulse (!) 106   Temp (!) 101.3 °F (38.5 °C)   Resp 16   Ht 6' 2\" (1.88 m)   Wt 99.8 kg (220 lb)   SpO2 97%   BMI 28.25 kg/m²     SpO2 Readings from Last 6 Encounters:   12/09/21 97%   08/07/20 99%   08/29/16 98%   05/25/15 94%   12/31/14 95%   10/02/13 100%        No intake or output data in the 24 hours ending 12/09/21 0612         Exam:     Physical Exam:    Gen:  Well-developed, well-nourished, in no acute distress  HEENT:  No scleral icterus, hearing intact to voice, moist mucous membranes  Neck:  Supple, without masses  Resp:  No accessory muscle use. CTAB without wheezing, rales, rhonchi  Card: tachycardic, reg rhythm. Normal S1 and S2 without murmurs, rubs, or gallops. No peripheral lower extremity edema. No JVD. Peripheral pulses in tact. Abd:  Normoactive bowel sounds. Soft,+left CVA tenderness, non-distended. No rebound, no guarding. No appreciable hepatosplenomegaly   Musc:  No cyanosis or clubbing  Skin:  No rashes or ulcers; turgor intact. Neuro:  Cranial nerves are grossly intact, no focal motor weakness, follows commands appropriately  Psych:  Good insight, normal affect. Alert, oriented x 3. Answers questions appropriately       Labs:    Recent Labs     12/08/21  2245   WBC 18.1*   HGB 16.2   HCT 46.3        Recent Labs     12/08/21  2245   *   K 3.2*      CO2 20*   *   BUN 11   CREA 1.33*   CA 8.8   MG 1.8   ALB 3.6   ALT 23     No components found for: GLPOC  No results for input(s): PH, PCO2, PO2, HCO3, FIO2 in the last 72 hours. No results for input(s): INR, INREXT in the last 72 hours. No results found for: SDES  No results found for: CULT  All other current labs reviewed in the computer. Imaging/Studies:    XR CHEST PA LAT    Result Date: 12/8/2021  Negative. CT ABD PELV W CONT    Result Date: 12/9/2021  1. Left pyelonephritis.  2. Left nephroureteral stent in place.    ___________________________________________________    Attending Physician: Rina Erwin MD

## 2021-12-09 NOTE — CONSULTS
New Urology Consult Note    Patient: Alphonso Jones MRN: [id number]  SSN: [id number]    YOB: 1990  Age: 36 y.o. Sex: male            Assessment:     Alphonso Jones is a 36 y.o. male who presented to the ED with complaints of body aches, chills, nausea and vomiting. Currently admitted for sepsis, pyelonephritis. He is an known patient to , followed by Dr. Claudia Allen. He had left ureteral stent placement on 11/8/2021 due to 8mm left proximal ureteral stone and concerns for infection. He was seen in follow up and had left ureteroscopy with laser lithotripsy and stent placement on 12/6/2021. Due to size of stone and difficulty with visualization he is scheduled for completion of ureteroscopy on 12/17/2021. Urine culture form our office on 12/8/2021 - less than 10,000 colony forming units of bacteria. Recommendations:     1. CT images reviewed. Air in collecting system likely due to recent procedure. Stent in appropriate position. Currently no indication for surgical intervention. 2. Treat fever  3. Cultures pending. Currently on CTX. Continue culture directed abx  4. WBC 18.1 last night. Labs ordered for later today. Keep NPO for now pending results. 5. Will reschedule PAT appointment and repeat ureteroscopy with our office depending on course of hospitalization. Thank you for this consult. Please contact [facility] with any further questions/concerns. Leslie Zapata NP [phone]    History of Present Illness:     Chief Complaint:  Body aches    Alphonso Jones is seen in consultation for reasons noted above at the request of David Simms MD.    This is a 36 y.o. male with recent ureteroscopy who developed chills, body aches, nausea and vomiting. He denies any flank or abdominal pain. Denies known fevers at home. Denies voiding symptoms including hematuria, dysuria, frequency, urgency. reports feeling better since admission to the hospital. Mother at bedside and contributes to HPI.     Temp 101.3 on arrival,   WBC 18.1  LA 0.7  Creatinine 1.33 (1.04 11/8/2021)  UA consistent with infection, culture pending  CT images reviewed and agree with radiologist findings      Subjective     Past Medical History  Past Medical History:   Diagnosis Date    ED (erectile dysfunction)     Flank pain     Hematoma     Hydronephrosis     Kidney stone     Pelvis fracture ([address]     Seizure ([address]     Seizures ([address]     UPJ obstruction, acquired     Urethral injury     Urethral stricture     UTI (urinary tract infection)     White coat hypertension        Past Surgical History:   Past Surgical History:   Procedure Laterality Date    HX OTHER SURGICAL      vein surgically removed off left kidney when he was 17 y/o    HX OTHER SURGICAL  05/24/2020    [place] CYSTOURETHROSCOPY,URETER CATHETER, CYSTOSCOPY, WITH RETROGRADE PYELOGRAM. Janet Al    IR ASP BLADDER SUPRA CATH  8/7/2020       Medication:  Current Facility-Administered Medications   Medication Dose Route Frequency Provider Last Rate Last Admin    sodium chloride (NS) flush 5-10 mL  5-10 mL IntraVENous PRN Fercho Zavala MD        cefTRIAXone (ROCEPHIN) 2 g in sterile water (preservative free) 20 mL IV syringe  2 g IntraVENous Q24H Traci Sheffield MD        sodium chloride (NS) flush 5-40 mL  5-40 mL IntraVENous Q8H Traci Sheffield MD   10 mL at 12/09/21 0600    sodium chloride (NS) flush 5-40 mL  5-40 mL IntraVENous PRN Traci Sheffield MD        acetaminophen (TYLENOL) tablet 650 mg  650 mg Oral Q6H PRN Traci Sheffield MD        Or   [facility] acetaminophen (TYLENOL) suppository 650 mg  650 mg Rectal Q6H PRN Traci Sheffield MD        polyethylene glycol [facility]) packet 17 g  17 g Oral DAILY PRN Traci Sheffield MD        lactated Ringers infusion  125 mL/hr IntraVENous CONTINUOUS Traci Sheffield  mL/hr at 12/09/21 0203 125 mL/hr at 12/09/21 0203    vancomycin (VANCOCIN) 1,250 mg in 0.9% sodium chloride 250 mL (VIAL-MATE) 1,250 mg IntraVENous Q12H Mikayla Rivera MD        L.acidophilus-paracasei-S.thermophil-bifidobacter (RISAQUAD) 8 billion cell capsule  1 Capsule Oral DAILY Mikayla Rivera MD   1 Capsule at 12/09/21 0234    levETIRAcetam (KEPPRA) tablet 1,000 mg  1,000 mg Oral BID Mikayla Rivera MD        zonisamide (ZONEGRAN) capsule 50 mg  50 mg Oral DAILY Mikayla Rivera MD        enoxaparin (LOVENOX) injection 40 mg  40 mg SubCUTAneous Q24H Mikayla Rivera MD        HYDROmorphone (DILAUDID) tablet 1 mg  1 mg Oral Q4H PRN Mikayla Rivera MD        naloxone [facility]) injection 0.4 mg  0.4 mg IntraVENous EVERY 2 MINUTES AS NEEDED Mikayla Rivera MD        ondansetron [facility]) injection 4 mg  4 mg IntraVENous Q6H PRN Mikayla Rivera MD         Current Outpatient Medications   Medication Sig Dispense Refill    tadalafiL (CIALIS) 5 mg tablet Take 1 Tablet by mouth daily as needed for Erectile Dysfunction. 90 Tablet 0    tadalafiL (CIALIS) 20 mg tablet Take 1 Tablet by mouth as needed for Erectile Dysfunction. 12 Tablet 3    zonisamide (ZONEGRAN) 50 mg capsule Take  by mouth daily.  levETIRAcetam (KEPPRA) 1,000 mg tablet Take 1 Tab by mouth two (2) times a day. 60 Tab 1       Allergies: Allergies   Allergen Reactions    Fentanyl Itching and Nausea and Vomiting     Patient stated he also became flushed all over    Penicillins Unknown (comments) and Hives     unk    Penicillin G Unknown (comments)    Morphine Nausea and Vomiting       Social History:  Social History     Tobacco Use    Smoking status: Never Smoker    Smokeless tobacco: Never Used   Substance Use Topics    Alcohol use: No     Comment: socially    Drug use: No       Family History  Family History   Problem Relation Age of Onset   Dossie Brisa Migraines Mother        Review of Systems  Unchanged from admitting provider note from 12/9/2021 other than HPI.     Objective:     Vital signs in last 24 hours:  Visit Vitals  /64   Pulse (!) 106   Temp (!) 101.3 °F (38.5 °C) Resp 16   Ht 6' 2\" (1.88 m)   Wt 99.8 kg (220 lb)   SpO2 97%   BMI 28.25 kg/m²       Intake/Output last 3 shifts:      Physical Exam  General Appearance: NAD, awake, does not appear toxic  HENT: atraumatic, normal ears  Cardiovascular: not tachycardic, no LE edema  Respiratory: no distress, room air  Abdomen: soft, no suprapubic fullness or tenderness  : no CVA tenderness  Extremities: moves all  Musculoskeletal: normal alignment of neck and head  Neuro: Appropriate, no focal neurological deficits  Mood/Affect: appropriate, A&O x 3    Lab/Imaging Review:       Most Recent Labs:  Lab Results   Component Value Date/Time    WBC 18.1 (H) 12/08/2021 10:45 PM    HGB 16.2 12/08/2021 10:45 PM    HCT 46.3 12/08/2021 10:45 PM    PLATELET 957 97/67/8032 10:45 PM    MCV 85.3 12/08/2021 10:45 PM        Lab Results   Component Value Date/Time    Sodium 134 (L) 12/08/2021 10:45 PM    Potassium 3.2 (L) 12/08/2021 10:45 PM    Chloride 105 12/08/2021 10:45 PM    CO2 20 (L) 12/08/2021 10:45 PM    Anion gap 9 12/08/2021 10:45 PM    Glucose 121 (H) 12/08/2021 10:45 PM    BUN 11 12/08/2021 10:45 PM    Creatinine 1.33 (H) 12/08/2021 10:45 PM    BUN/Creatinine ratio 8 (L) 12/08/2021 10:45 PM    GFR est AA >60 12/08/2021 10:45 PM    GFR est non-AA >60 12/08/2021 10:45 PM    Calcium 8.8 12/08/2021 10:45 PM    Bilirubin, total 0.9 12/08/2021 10:45 PM    Alk.  phosphatase 83 12/08/2021 10:45 PM    Protein, total 7.6 12/08/2021 10:45 PM    Albumin 3.6 12/08/2021 10:45 PM    Globulin 4.0 12/08/2021 10:45 PM    A-G Ratio 0.9 (L) 12/08/2021 10:45 PM    ALT (SGPT) 23 12/08/2021 10:45 PM        No results found for: PSA, PSA2, PSAR1, Suezanne Gaston, PSAR3, [id number], [id number], 07312, PSAEXT     COAGS:  No results found for: APTT, PTP, INR, INREXT    No results found for: HBA1C, QRW7OLNR, KXZ8ODOU     Lab Results   Component Value Date/Time    CK 67 05/24/2015 11:40 PM    CK - MB 0.7 05/24/2015 11:40 PM    CK-MB Index 1.0 05/24/2015 11:40 PM Urine/Blood Cultures:  Results     Procedure Component Value Units Date/Time    C. DIFFICILE AG & TOXIN A/B [[id number]]     Order Status: Sent Specimen: Stool     CULTURE, URINE [[id number]] Collected: 12/09/21 0015    Order Status: Canceled Specimen: Urine from Clean catch     COVID-19 RAPID TEST [[id number]] Collected: 12/08/21 2252    Order Status: Completed Specimen: Nasopharyngeal Updated: 12/08/21 2348     Specimen source Nasopharyngeal        COVID-19 rapid test Not detected        Comment: Rapid Abbott ID Now       Rapid NAAT:  The specimen is NEGATIVE for SARS-CoV-2, the novel coronavirus associated with COVID-19. Negative results should be treated as presumptive and, if inconsistent with clinical signs and symptoms or necessary for patient management, should be tested with an alternative molecular assay. Negative results do not preclude SARS-CoV-2 infection and should not be used as the sole basis for patient management decisions. This test has been authorized by the FDA under an Emergency Use Authorization (EUA) for use by authorized laboratories. Fact sheet for Healthcare Providers: ConventionUpdate.co.nz  Fact sheet for Patients: ConventionUpdate.co.nz       Methodology: Isothermal Nucleic Acid Amplification         CULTURE, BLOOD, PAIRED [[id number]] Collected: 12/08/21 2245    Order Status: Completed Specimen: Blood Updated: 12/09/21 0623     Special Requests: NO SPECIAL REQUESTS        Culture result: NO GROWTH AFTER 7 HOURS       CULTURE, URINE [[id number]] Collected: 12/08/21 2245    Order Status: Completed Specimen: Urine from Clean catch Updated: 12/09/21 0213    CULTURE, URINE [[id number]]     Order Status: Canceled Specimen: Urine from Clean catch              IMAGING:  XR CHEST PA LAT    Result Date: 12/8/2021  Clinical indication: Liver, cough. Frontal and lateral views of the chest obtained.  The a heart size is normal. No acute infiltrate. Negative. CT ABD PELV W CONT    Result Date: 12/9/2021  EXAM: CT ABD PELV W CONT INDICATION: Fever and status post lithotripsy with stent placement COMPARISON: 2015 CONTRAST: 100 mL of Isovue-370. TECHNIQUE: Following the uneventful intravenous administration of contrast, thin axial images were obtained through the abdomen and pelvis. Coronal and sagittal reconstructions were generated. Oral contrast was not administered. CT dose reduction was achieved through use of a standardized protocol tailored for this examination and automatic exposure control for dose modulation. FINDINGS: LOWER THORAX: No significant abnormality in the incidentally imaged lower chest. LIVER: Tiny hypodensity in the liver is too small to characterize. BILIARY TREE: Gallbladder is within normal limits. CBD is not dilated. SPLEEN: within normal limits. PANCREAS: No mass or ductal dilatation. ADRENALS: Unremarkable. KIDNEYS: Air in the left renal collecting system. Left nephrolithiasis. Left nephroureteral stent in place. Multiple areas of decreased peripheral enhancement in the left kidney. Left perinephric stranding. STOMACH: Unremarkable. SMALL BOWEL: No dilatation or wall thickening. COLON: Fluid in the colon. APPENDIX: Normal. PERITONEUM: No ascites or pneumoperitoneum. RETROPERITONEUM: No lymphadenopathy or aortic aneurysm. REPRODUCTIVE ORGANS: Unremarkable. URINARY BLADDER: No mass or calculus. BONES: Prior surgery to the sacrum. ABDOMINAL WALL: No mass or hernia. ADDITIONAL COMMENTS: N/A     1. Left pyelonephritis. 2. Left nephroureteral stent in place.           Signed By: Samaria Matthew NP  - December 9, 2021

## 2021-12-09 NOTE — PROGRESS NOTES
[facility]: MED RECONCILIATION    Comments/Recommendations:   Pharmacist called ER room 12 to review prior to admission medications with patient. Patient was sleeping. Prior to admission medications reviewed with patient's mother who was in ER room 12. Preferred pharmacy is [facility]. Doses of Levetiracetam and Zonisamide updated. Apixaban: had blood clot after previous hospital stay. Was taking 5 mg two times daily, dose subsequently reduced to 2.5 mg two times daily. Ciprofloxacin: x 7 days for post urological procedure prophylaxis  Ketorolac and Ondansetron: as needed for post urological procedure symptoms. Allergies: Fentanyl; Other plant, animal, environmental; Poison ivy extract; Poison sumac extract; Venom-honey bee; Venom-wasp; Morphine; and Penicillins    Prior to Admission Medications   Prescriptions Last Dose Informant Patient Reported? Taking? EPINEPHrine (EpiPen) 0.3 mg/0.3 mL injection  Parent Yes Yes   Si.3 mg by IntraMUSCular route as needed for Allergic Response. acetaminophen (TYLENOL) 500 mg tablet  Parent Yes Yes   Sig: Take 1,000 mg by mouth every six (6) hours as needed for Fever. apixaban (ELIQUIS) 2.5 mg tablet  Parent Yes Yes   Sig: Take 2.5 mg by mouth every twelve (12) hours. ciprofloxacin HCl (CIPRO) 500 mg tablet  Parent Yes Yes   Sig: Take 500 mg by mouth two (2) times a day.   ketorolac (TORADOL) 10 mg tablet  Parent Yes Yes   Sig: Take 10 mg by mouth every six (6) hours as needed for Pain.   levETIRAcetam (KEPPRA) 750 mg tablet  Parent Yes Yes   Sig: Take 750 mg by mouth every twelve (12) hours. midazolam 5 mg/spray (0.1 mL) spry  Parent Yes Yes   Sig: by Nasal route as needed (breakthrough seizure). ondansetron hcl (ZOFRAN) 8 mg tablet  Parent Yes Yes   Sig: Take 8 mg by mouth every eight (8) hours as needed for Nausea or Vomiting.   tadalafiL (CIALIS) 5 mg tablet  Parent Yes Yes   Sig: Take 5 mg by mouth daily.    zonisamide (ZONEGRAN) 100 mg capsule Parent Yes Yes   Sig: Take 200 mg by mouth daily. zonisamide (ZONEGRAN) 100 mg capsule  Parent Yes Yes   Sig: Take 100 mg by mouth every evening.         Beryle Schneider, Pharmacist

## 2021-12-09 NOTE — PROGRESS NOTES
I have reviewed discharge instructions with the patient and caregiver. The patient and caregiver verbalized understanding. Discharge medications reviewed with patient and caregiver and appropriate educational materials and side effects teaching were provided.

## 2021-12-09 NOTE — ED TRIAGE NOTES
Patient had kidney stone removal (left) on Monday, stents was placed, no painful urination. On the next day developed vomiting and fever today. Reports generalizeds body aches. Patient took Tylenol at around lunch time today.

## 2021-12-09 NOTE — ED PROVIDER NOTES
This is a 72-year-old male for multiple chronic medical problems who is status post lithotripsy and stent placement 2 to 3 days ago who presents to the ED with a complaint of chills, general malaise, body aches, fever since yesterday accompanied by nausea and vomiting today. The patient denies any headache, neck pain or stiffness, chest pain, shortness of breath, dry cough, congestion, dysuria, abdominal pain, sick contact, skin rash or recent travel. The patient is not vaccinated against COVID-19.            Past Medical History:   Diagnosis Date    ED (erectile dysfunction)     Flank pain     Hematoma     Hydronephrosis     Kidney stone     Pelvis fracture (HCC)     Seizure (HCC)     Seizures (HCC)     UPJ obstruction, acquired     Urethral injury     Urethral stricture     UTI (urinary tract infection)     White coat hypertension        Past Surgical History:   Procedure Laterality Date    HX OTHER SURGICAL      vein surgically removed off left kidney when he was 17 y/o    HX OTHER SURGICAL  05/24/2020    [place] CYSTOURETHROSCOPY,URETER CATHETER, CYSTOSCOPY, WITH RETROGRADE PYELOGRAM. Janet Monge    IR ASP BLADDER SUPRA CATH  8/7/2020         Family History:   Problem Relation Age of Onset    Migraines Mother        Social History     Socioeconomic History    Marital status: SINGLE     Spouse name: Not on file    Number of children: Not on file    Years of education: Not on file    Highest education level: Not on file   Occupational History    Not on file   Tobacco Use    Smoking status: Never Smoker    Smokeless tobacco: Never Used   Substance and Sexual Activity    Alcohol use: No     Comment: socially    Drug use: No    Sexual activity: Not Currently   Other Topics Concern    Not on file   Social History Narrative    ** Merged History Encounter **          Social Determinants of Health     Financial Resource Strain:     Difficulty of Paying Living Expenses: Not on file   Food Insecurity:     Worried About Running Out of Food in the Last Year: Not on file    Santhosh of Food in the Last Year: Not on file   Transportation Needs:     Lack of Transportation (Medical): Not on file    Lack of Transportation (Non-Medical): Not on file   Physical Activity:     Days of Exercise per Week: Not on file    Minutes of Exercise per Session: Not on file   Stress:     Feeling of Stress : Not on file   Social Connections:     Frequency of Communication with Friends and Family: Not on file    Frequency of Social Gatherings with Friends and Family: Not on file    Attends Zoroastrianism Services: Not on file    Active Member of [address] [place] [address] [employer] or Organizations: Not on file    Attends Club or Organization Meetings: Not on file    Marital Status: Not on file   Intimate Partner Violence:     Fear of Current or Ex-Partner: Not on file    Emotionally Abused: Not on file    Physically Abused: Not on file    Sexually Abused: Not on file   Housing Stability:     Unable to Pay for Housing in the Last Year: Not on file    Number of [place] in the Last Year: Not on file    Unstable Housing in the Last Year: Not on file         ALLERGIES: Fentanyl, Penicillins, Penicillin g, and Morphine    Review of Systems   All other systems reviewed and are negative. Vitals:    12/08/21 2211   BP: 125/73   Pulse: (!) 106   Resp: 16   Temp: (!) 101.3 °F (38.5 °C)   SpO2: 96%   Weight: 99.8 kg (220 lb)   Height: 6' 2\" (1.88 m)            Physical Exam  Vitals and nursing note reviewed. Exam conducted with a chaperone present. CONSTITUTIONAL: Well-appearing; well-nourished; in no apparent distress  HEAD: Normocephalic; atraumatic  EYES: PERRL; EOM intact; conjunctiva and sclera are clear bilaterally. ENT: No rhinorrhea; normal pharynx with no tonsillar hypertrophy; mucous membranes pink/moist, no erythema, no exudate.   NECK: Supple; non-tender; no cervical lymphadenopathy  CARD: Normal S1, S2; no murmurs, rubs, or gallops. Regular rate and rhythm. RESP: Normal respiratory effort; breath sounds clear and equal bilaterally; no wheezes, rhonchi, or rales. ABD: Normal bowel sounds; non-distended; non-tender; no palpable organomegaly, no masses, no bruits. Back Exam: Normal inspection; no vertebral point tenderness, no CVA tenderness. Normal range of motion. EXT: Normal ROM in all four extremities; non-tender to palpation; no swelling or deformity; distal pulses are normal, no edema. SKIN: Warm; dry; no rash. NEURO:Alert and oriented x 3, coherent, TONY-XII grossly intact, sensory and motor are non-focal.        MDM  Number of Diagnoses or Management Options  Diagnosis management comments: Assessment: Acute febrile illness rule out sepsis with occult bacteremia. The patient is a dynamically stable. He is not vaccinated. He will also need evaluation for Covid and influenza    Plan: Lab/IV fluid/antipyretic/chest x-ray/antiemetic/broad-spectrum antibiotic/education, reassurance, symptomatic treatment/ Monitor and Reevaluate. Amount and/or Complexity of Data Reviewed  Clinical lab tests: ordered and reviewed  Tests in the radiology section of CPT®: ordered and reviewed  Tests in the medicine section of CPT®: reviewed and ordered  Discussion of test results with the performing providers: yes  Decide to obtain previous medical records or to obtain history from someone other than the patient: yes  Obtain history from someone other than the patient: yes  Review and summarize past medical records: yes  Discuss the patient with other providers: yes    Risk of Complications, Morbidity, and/or Mortality  Presenting problems: moderate  Diagnostic procedures: moderate  Management options: moderate           Procedures      XRAY INTERPRETATION (ED MD)  Chest Xray  No acute process seen. Normal heart size. No bony abnormalities. No infiltrate.   Mary Mitchell MD 10:48 PM    PROGRESS NOTE:  Pt has been reexamined by Marcia Perry Prince lona MD all available results have been reviewed with pt and any available family. Pt understands sx, dx, and tx in ED. Care plan has been outlined and questions have been answered. Pt and any available family understands and agrees to need for admission to hospital for further tx not available in ED. Pt is ready for admission. Written by Sadia Peralta MD,  1:11 AM    CONSULT NOTE:  Emma Marquez MD spoke with Dr. Isha Werner of the adult hospitalist team. Discussed patient's presentation, history, physical assessment, and available diagnostic results. He will evaluate, write orders and admit the patient to the hospital. 1:11 AM    Perfect Serve Consult for Admission  1:12 AM    ED Room Number: V02/V02  Patient Name and age:  Prince Anglin 36 y.o.  male  Working Diagnosis:   1. Acute febrile illness    2. Hypokalemia    3. Pyelonephritis of left kidney    4. Severe sepsis ([address]        COVID-19 Suspicion:  yes  Sepsis present:  yes  Reassessment needed: no  Code Status:  Full Code  Readmission: no  Isolation Requirements:  yes  Recommended Level of Care:  med/surg  [place]  [facility] - [phone]    Other: The patient is not vaccinated against COVID-19 virus. He is status post lithotripsy 3 days ago. Urology has been notified. Total critical care time spent exclusive of procedures:  60 minutes. CONSULT NOTE:  Emma Marquez MD spoke with Dr. Myron Jimenez of [facility] discussed patient's presentation, history, physical assessment, and available diagnostic results. Will come and see the patient in the hospital in consult in the morning and agrees with current plan of care. .    Code Sepsis Reassessment & Plan    - Sepsis order set entered: YES  - Broad Spectrum Antibiotics given: Ceftriaxone  - Repeat lactic acid ordered for time Not needed  - Re-assessment performed at time: 01:00 AM, and clinical condition improving.    - Actions taken: None.   - Hypotension or Lactic Acidosis present (SBP<90, MAP<65, Lactate >4): NO   - IVF: 30 cc/kg actual Body Weight  - Persistent Septic Shock present (Hypotension despite IVF resuscitation): NO  Vasopressors: Not indicated due to septic shock not present  - Disposition: Admit to Telemetry        .
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
## 2021-12-09 NOTE — PROGRESS NOTES
[facility]    Medical Progress Note      NAME: Lilian Doyle   :  1990  MRM:  [id number]    Date/Time of service 2021  12:59 PM          Assessment and Plan:     Pyelonephritis S/P cystoscopy / Kidney stone - POA, due to recent stent placement. Urology consulted and plan no procedure. Continue ceftriaxone and vanco.  Monitor cx. Hold NSAIDS    Sepsis / Leukocytosis / Fever / Sinus tachycardia - POA due to pyelo. Monitor cX. Supportive care    Hx Seizure - continue keppra, zonisamide. Hold eliquis       Subjective:     Chief Complaint:  Feels better, fever gone    ROS:  (bold if positive, if negative)    Tolerating some PT  Tolerating Diet        Objective:     Last 24hrs VS reviewed since prior progress note.  Most recent are:    Visit Vitals  /64   Pulse (!) 106   Temp 98.4 °F (36.9 °C)   Resp 16   Ht 6' 2\" (1.88 m)   Wt 99.8 kg (220 lb)   SpO2 97%   BMI 28.25 kg/m²     SpO2 Readings from Last 6 Encounters:   21 97%   20 99%   16 98%   05/25/15 94%   14 95%   10/02/13 100%            Intake/Output Summary (Last 24 hours) at 2021 1259  Last data filed at 2021 1227  Gross per 24 hour   Intake 4574 ml   Output 500 ml   Net 4074 ml        Physical Exam:    Gen:  Well-developed, well-nourished, in no acute distress  HEENT:  Pink conjunctivae, PERRL, hearing intact to voice, moist mucous membranes  Neck:  Supple, without masses, thyroid non-tender  Resp:  No accessory muscle use, clear breath sounds without wheezes rales or rhonchi  Card:  No murmurs, tachycardic S1, S2 without thrills, bruits or peripheral edema  Abd:  Soft, non-tender, non-distended, normoactive bowel sounds are present, no mass  Lymph:  No cervical or inguinal adenopathy  Musc:  No cyanosis or clubbing  Skin:  No rashes or ulcers, skin turgor is good  Neuro:  Cranial nerves are grossly intact, no focal motor weakness, follows commands appropriately  Psych:  Good insight, oriented to person, place and time, alert    Telemetry reviewed:   normal sinus rhythm  __________________________________________________________________  Medications Reviewed: (see below)  Medications:     Current Facility-Administered Medications   Medication Dose Route Frequency    sodium chloride (NS) flush 5-10 mL  5-10 mL IntraVENous PRN    cefTRIAXone (ROCEPHIN) 2 g in sterile water (preservative free) 20 mL IV syringe  2 g IntraVENous Q24H    sodium chloride (NS) flush 5-40 mL  5-40 mL IntraVENous Q8H    sodium chloride (NS) flush 5-40 mL  5-40 mL IntraVENous PRN    acetaminophen (TYLENOL) tablet 650 mg  650 mg Oral Q6H PRN    Or    acetaminophen (TYLENOL) suppository 650 mg  650 mg Rectal Q6H PRN    polyethylene glycol (MIRALAX) packet 17 g  17 g Oral DAILY PRN    lactated Ringers infusion  125 mL/hr IntraVENous CONTINUOUS    vancomycin (VANCOCIN) 1,250 mg in 0.9% sodium chloride 250 mL (VIAL-MATE)  1,250 mg IntraVENous Q12H    L.acidophilus-paracasei-S.thermophil-bifidobacter (RISAQUAD) 8 billion cell capsule  1 Capsule Oral DAILY    zonisamide (ZONEGRAN) capsule 200 mg  200 mg Oral DAILY    enoxaparin (LOVENOX) injection 40 mg  40 mg SubCUTAneous Q24H    HYDROmorphone (DILAUDID) tablet 1 mg  1 mg Oral Q4H PRN    naloxone (NARCAN) injection 0.4 mg  0.4 mg IntraVENous EVERY 2 MINUTES AS NEEDED    ondansetron (ZOFRAN) injection 4 mg  4 mg IntraVENous Q6H PRN    levETIRAcetam (KEPPRA) tablet 750 mg  750 mg Oral Q12H    zonisamide (ZONEGRAN) capsule 100 mg  100 mg Oral QPM    [START ON 12/10/2021] Vancomycin Random Level: please obtain at 0800 on 12/10/2021. Thank you! 1 Each Other ONCE     Current Outpatient Medications   Medication Sig    levETIRAcetam (KEPPRA) 750 mg tablet Take 750 mg by mouth every twelve (12) hours.  tadalafiL (CIALIS) 5 mg tablet Take 5 mg by mouth daily.  zonisamide (ZONEGRAN) 100 mg capsule Take 200 mg by mouth daily.     zonisamide (ZONEGRAN) 100 mg capsule Take 100 mg by mouth every evening.  EPINEPHrine (EpiPen) 0.3 mg/0.3 mL injection 0.3 mg by IntraMUSCular route as needed for Allergic Response.  midazolam 5 mg/spray (0.1 mL) spry by Nasal route as needed (breakthrough seizure).  apixaban (ELIQUIS) 2.5 mg tablet Take 2.5 mg by mouth every twelve (12) hours.  acetaminophen (TYLENOL) 500 mg tablet Take 1,000 mg by mouth every six (6) hours as needed for Fever.  ciprofloxacin HCl (CIPRO) 500 mg tablet Take 500 mg by mouth two (2) times a day.  ketorolac (TORADOL) 10 mg tablet Take 10 mg by mouth every six (6) hours as needed for Pain.  ondansetron hcl (ZOFRAN) 8 mg tablet Take 8 mg by mouth every eight (8) hours as needed for Nausea or Vomiting. Lab Data Reviewed: (see below)  Lab Review:     Recent Labs     12/09/21  1111 12/08/21 2245   WBC 13.6* 18.1*   HGB 14.4 16.2   HCT 41.6 46.3    246     Recent Labs     12/09/21  1111 12/08/21 2245    134*   K 3.6 3.2*   * 105   CO2 19* 20*   GLU 99 121*   BUN 11 11   CREA 0.85 1.33*   CA 8.3* 8.8   MG  --  1.8   ALB 2.8* 3.6   TBILI 0.5 0.9   ALT 18 23     No results found for: GLUCPOC  No results for input(s): PH, PCO2, PO2, HCO3, FIO2 in the last 72 hours. No results for input(s): INR, INREXT in the last 72 hours.   All Micro Results     Procedure Component Value Units Date/Time    C. DIFFICILE AG & TOXIN A/B [[id number]] Collected: 12/09/21 1111    Order Status: Completed Specimen: Stool Updated: 12/09/21 1130    CULTURE, URINE [[id number]] Collected: 12/08/21 2245    Order Status: Completed Specimen: Urine from Clean catch Updated: 12/09/21 0818    CULTURE, BLOOD, PAIRED [[id number]] Collected: 12/08/21 2245    Order Status: Completed Specimen: Blood Updated: 12/09/21 0623     Special Requests: NO SPECIAL REQUESTS        Culture result: NO GROWTH AFTER 7 HOURS       CULTURE, URINE [[id number]] Collected: 12/09/21 0015    Order Status: Canceled Specimen: Urine from Clean catch COVID-19 RAPID TEST [[id number]] Collected: 12/08/21 2252    Order Status: Completed Specimen: Nasopharyngeal Updated: 12/08/21 2348     Specimen source Nasopharyngeal        COVID-19 rapid test Not detected        Comment: Rapid Abbott ID Now       Rapid NAAT:  The specimen is NEGATIVE for SARS-CoV-2, the novel coronavirus associated with COVID-19. Negative results should be treated as presumptive and, if inconsistent with clinical signs and symptoms or necessary for patient management, should be tested with an alternative molecular assay. Negative results do not preclude SARS-CoV-2 infection and should not be used as the sole basis for patient management decisions. This test has been authorized by the FDA under an Emergency Use Authorization (EUA) for use by authorized laboratories. Fact sheet for Healthcare Providers: ConventionUpdate.co.nz  Fact sheet for Patients: ConventionUpdate.co.nz       Methodology: Isothermal Nucleic Acid Amplification         CULTURE, URINE [[id number]]     Order Status: Canceled Specimen: Urine from Clean catch           Other pertinent lab: none    Total time spent with patient: 45 Minutes I personally rounded from 3:25 - 4:10, in addition to the time spent by my partner, Dr Fartun Pederson, earlier today. I personally reviewed chart, notes, data and current medications in the medical record. I have personally examined and treated the patient at bedside during this period. I personally made additional diagnoses, placed additional orders and had additional discussions.                  Care Plan discussed with: Patient, Family, Nursing Staff and >50% of time spent in counseling and coordination of care    Discussed:  Care Plan and D/C Planning    Prophylaxis:  H2B/PPI    Disposition:  Home w/Family           ___________________________________________________    Attending Physician: Gracia Melo MD

## 2021-12-09 NOTE — PROGRESS NOTES
[facility] Progress Note: Antimicrobial Stewardship    Consult for antibiotic dosing of Vancomycin by Dr. Ed Yip  Indication: hx of enterococcus UTI, current fever  Day of Therapy: 1    Plan:  Vancomycin therapy:  1. Start with loading dose of vancomycin 2.25 grams   2. Follow with maintenance dose of vancomycin 1250 mg IV every 12 hours   3. Dose calculated to approximate a   a. Target AUC/AHMET of 400-600  b. Trough of ~15 mcg/mL. 4.  Plan: Will order level in next 24-48 hours to assess clearance    Pharmacy to follow daily and will make changes to dose and/or frequency based on clinical status. Other Antimicrobial  (not dosed by pharmacist)   Rocephin   Cultures     Urine pending  Blood pending   Serum Creatinine     Lab Results   Component Value Date/Time    Creatinine 1.33 (H) 12/08/2021 10:45 PM       Creatinine Clearance Estimated Creatinine Clearance: 103.4 mL/min (A) (based on SCr of 1.33 mg/dL (H)).      Procalcitonin    Lab Results   Component Value Date/Time    Procalcitonin 0.28 12/08/2021 10:45 PM        WBC   Lab Results   Component Value Date/Time    WBC 18.1 (H) 12/08/2021 10:45 PM         Pharmacist: Melita Bass Flash

## 2021-12-09 NOTE — PROGRESS NOTES
Labs reviewed. WBC and creatinine improved, afebrile. Ok to eat and continue with current plan. Will follow up in the morning.   Faheem Luis NP

## 2021-12-10 VITALS
TEMPERATURE: 98.1 F | WEIGHT: 220 LBS | DIASTOLIC BLOOD PRESSURE: 75 MMHG | SYSTOLIC BLOOD PRESSURE: 116 MMHG | HEIGHT: 74 IN | HEART RATE: 62 BPM | OXYGEN SATURATION: 100 % | BODY MASS INDEX: 28.23 KG/M2 | RESPIRATION RATE: 18 BRPM

## 2021-12-10 LAB
ALBUMIN SERPL-MCNC: 2.4 G/DL (ref 3.5–5)
ALBUMIN/GLOB SERPL: 0.8 {RATIO} (ref 1.1–2.2)
ALP SERPL-CCNC: 58 U/L (ref 45–117)
ALT SERPL-CCNC: 20 U/L (ref 12–78)
ANION GAP SERPL CALC-SCNC: 7 MMOL/L (ref 5–15)
AST SERPL-CCNC: 13 U/L (ref 15–37)
BACTERIA SPEC CULT: NORMAL
BASOPHILS # BLD: 0 K/UL (ref 0–0.1)
BASOPHILS NFR BLD: 0 % (ref 0–1)
BILIRUB SERPL-MCNC: 0.4 MG/DL (ref 0.2–1)
BUN SERPL-MCNC: 9 MG/DL (ref 6–20)
BUN/CREAT SERPL: 12 (ref 12–20)
CALCIUM SERPL-MCNC: 7.9 MG/DL (ref 8.5–10.1)
CC UR VC: NORMAL
CHLORIDE SERPL-SCNC: 114 MMOL/L (ref 97–108)
CO2 SERPL-SCNC: 21 MMOL/L (ref 21–32)
CREAT SERPL-MCNC: 0.77 MG/DL (ref 0.7–1.3)
DIFFERENTIAL METHOD BLD: ABNORMAL
EOSINOPHIL # BLD: 0.2 K/UL (ref 0–0.4)
EOSINOPHIL NFR BLD: 2 % (ref 0–7)
ERYTHROCYTE [DISTWIDTH] IN BLOOD BY AUTOMATED COUNT: 12.5 % (ref 11.5–14.5)
GLOBULIN SER CALC-MCNC: 3.2 G/DL (ref 2–4)
GLUCOSE SERPL-MCNC: 98 MG/DL (ref 65–100)
HCT VFR BLD AUTO: 36.6 % (ref 36.6–50.3)
HGB BLD-MCNC: 12.5 G/DL (ref 12.1–17)
IMM GRANULOCYTES # BLD AUTO: 0 K/UL (ref 0–0.04)
IMM GRANULOCYTES NFR BLD AUTO: 0 % (ref 0–0.5)
LYMPHOCYTES # BLD: 2 K/UL (ref 0.8–3.5)
LYMPHOCYTES NFR BLD: 23 % (ref 12–49)
MAGNESIUM SERPL-MCNC: 1.9 MG/DL (ref 1.6–2.4)
MCH RBC QN AUTO: 29.3 PG (ref 26–34)
MCHC RBC AUTO-ENTMCNC: 34.2 G/DL (ref 30–36.5)
MCV RBC AUTO: 85.9 FL (ref 80–99)
MONOCYTES # BLD: 1.4 K/UL (ref 0–1)
MONOCYTES NFR BLD: 16 % (ref 5–13)
NEUTS SEG # BLD: 5.1 K/UL (ref 1.8–8)
NEUTS SEG NFR BLD: 59 % (ref 32–75)
NRBC # BLD: 0 K/UL (ref 0–0.01)
NRBC BLD-RTO: 0 PER 100 WBC
PLATELET # BLD AUTO: 186 K/UL (ref 150–400)
PMV BLD AUTO: 9.9 FL (ref 8.9–12.9)
POTASSIUM SERPL-SCNC: 3.6 MMOL/L (ref 3.5–5.1)
PROT SERPL-MCNC: 5.6 G/DL (ref 6.4–8.2)
RBC # BLD AUTO: 4.26 M/UL (ref 4.1–5.7)
SERVICE CMNT-IMP: NORMAL
SODIUM SERPL-SCNC: 142 MMOL/L (ref 136–145)
VANCOMYCIN SERPL-MCNC: 13.1 UG/ML
WBC # BLD AUTO: 8.7 K/UL (ref 4.1–11.1)

## 2021-12-10 PROCEDURE — 74011250637 HC RX REV CODE- 250/637: Performed by: INTERNAL MEDICINE

## 2021-12-10 PROCEDURE — 74011000250 HC RX REV CODE- 250: Performed by: INTERNAL MEDICINE

## 2021-12-10 PROCEDURE — 36415 COLL VENOUS BLD VENIPUNCTURE: CPT

## 2021-12-10 PROCEDURE — 83735 ASSAY OF MAGNESIUM: CPT

## 2021-12-10 PROCEDURE — 74011250636 HC RX REV CODE- 250/636: Performed by: INTERNAL MEDICINE

## 2021-12-10 PROCEDURE — 80053 COMPREHEN METABOLIC PANEL: CPT

## 2021-12-10 PROCEDURE — 80202 ASSAY OF VANCOMYCIN: CPT

## 2021-12-10 PROCEDURE — 85025 COMPLETE CBC W/AUTO DIFF WBC: CPT

## 2021-12-10 RX ORDER — VANCOMYCIN HYDROCHLORIDE 250 MG/5ML
125 POWDER, FOR SOLUTION ORAL EVERY 6 HOURS
Qty: 70 ML | Refills: 0 | Status: SHIPPED | OUTPATIENT
Start: 2021-12-10 | End: 2021-12-17

## 2021-12-10 RX ORDER — CEPHALEXIN 250 MG/1
500 CAPSULE ORAL EVERY 6 HOURS
Status: DISCONTINUED | OUTPATIENT
Start: 2021-12-10 | End: 2021-12-10 | Stop reason: HOSPADM

## 2021-12-10 RX ORDER — CEPHALEXIN 500 MG/1
500 CAPSULE ORAL EVERY 6 HOURS
Qty: 28 CAPSULE | Refills: 0 | Status: SHIPPED | OUTPATIENT
Start: 2021-12-10 | End: 2021-12-13 | Stop reason: SDUPTHER

## 2021-12-10 RX ADMIN — Medication 1 CAPSULE: at 09:26

## 2021-12-10 RX ADMIN — VANCOMYCIN HYDROCHLORIDE 125 MG: 250 POWDER, FOR SOLUTION ORAL at 10:48

## 2021-12-10 RX ADMIN — VANCOMYCIN HYDROCHLORIDE 125 MG: 250 POWDER, FOR SOLUTION ORAL at 17:03

## 2021-12-10 RX ADMIN — WATER 2 G: 1 INJECTION INTRAMUSCULAR; INTRAVENOUS; SUBCUTANEOUS at 09:26

## 2021-12-10 RX ADMIN — VANCOMYCIN HYDROCHLORIDE 125 MG: 250 POWDER, FOR SOLUTION ORAL at 02:45

## 2021-12-10 RX ADMIN — LEVETIRACETAM 750 MG: 250 TABLET, FILM COATED ORAL at 09:26

## 2021-12-10 RX ADMIN — ZONISAMIDE 200 MG: 100 CAPSULE ORAL at 09:37

## 2021-12-10 RX ADMIN — VANCOMYCIN HYDROCHLORIDE 1250 MG: 1.25 INJECTION, POWDER, LYOPHILIZED, FOR SOLUTION INTRAVENOUS at 02:44

## 2021-12-10 NOTE — PROGRESS NOTES
[facility] Dosing Services: Antimicrobial Stewardship Daily Doc    Consult for antibiotic dosing of Vancomycin by Dr. Racquel Brady  Indication: hx of enterococcus UTI; febrile  Day of Therapy: 2    Vancomycin therapy:  Current maintenance dose: vancomycin 1,250 mg IV every 12 hours   Dose calculated to approximate a target AUC/AHMET of 400-600. Assessment/Plan: Febrile at admission, currently afebrile. WBC 8.7 (down from 18.1 on 12/8). SCr improved (0.77). Level obtained 5 hours post-dose was 13.1 which calculates an estimated AUC < 400. Adjust dose to 1000 mg IV Q8H. This yields a predicted AUC of 467. SCr ordered every other day per protocol  Dose administration notes:   Doses given appropriately as scheduled    Date Dose & Interval Measured (mcg/mL) Extrapolated (mcg/mL)   ?12/10 ?1250 mg IV Q12H ?13.1 ?    ? ? ? ?   ? ? ? ? Other Antimicrobial   (not dosed by pharmacist) Ceftriaxone 2 grams IV every 24 hours  Vancomycin oral solution (for CDiff)   Cultures 12/09/2021 Stool for C diff: positive  12/08/2021 Blood: ngtd (pending)  12/08/2021 Urine: contaminated specimen   Serum Creatinine Lab Results   Component Value Date/Time    Creatinine 0.77 12/10/2021 04:29 AM       Creatinine Clearance Estimated Creatinine Clearance: 178.6 mL/min (based on SCr of 0.77 mg/dL). Temp Temp: 98.8 °F (37.1 °C)       WBC Lab Results   Component Value Date/Time    WBC 8.7 12/10/2021 04:29 AM      Procalcitonin Lab Results   Component Value Date/Time    Procalcitonin 0.28 12/08/2021 10:45 PM      For Antifungals, Metronidazole and Nafcillin: Lab Results   Component Value Date/Time    ALT (SGPT) 20 12/10/2021 04:29 AM    AST (SGOT) 13 (L) 12/10/2021 04:29 AM    Alk.  phosphatase 58 12/10/2021 04:29 AM    Bilirubin, total 0.4 12/10/2021 04:29 AM        Pharmacist Yoel Ernst

## 2021-12-10 NOTE — PROGRESS NOTES
Urology Progress Note    Patient: Gary Merchant MRN: [id number]  SSN: [id number]    YOB: 1990  Age: 36 y.o. Sex: male        Assessment:     Gary Merchant is a 36 y.o. male admitted to the hospital on 12/8/2021 for sepsis, pyelonephritis, C. Diff. Established patient of VU with recent ureteroscopy with laser lithotripsy and stent placement on 12/6/2021. CT on 12/7/2021 demonstrates left pyelonephritis and stent in appropriate position. Plan:     1. Improving clinically and stent in appropriate position. No plans for surgical intervention. 2. Continue culture directed abx.  3. Will cancel CRULS scheduled for 12/17/2021 and arrange outpatient office visit. Will sign off. Please call if needed. Reason For Visit:     Follow up for body aches. Interval History:     Continues to feel better. Denies pain. Remains afebrile, other VSS. WBC 8.7, creatinine 0.77. Cultures pending. ROS:     Denies fevers  Denies abdominal pain  Denies hematuria, frequency    Objective:     Visit Vitals  /66   Pulse 80   Temp 98.8 °F (37.1 °C)   Resp 19   Ht 6' 2\" (1.88 m)   Wt 99.8 kg (220 lb)   SpO2 100%   BMI 28.25 kg/m²         Intake/Output Summary (Last 24 hours) at 12/10/2021 0755  Last data filed at 12/9/2021 1550  Gross per 24 hour   Intake 1334 ml   Output 800 ml   Net 534 ml       Physical Exam  General: NAD  Respiratory: no distress, room air  Neuro: Appropriate, no focal neurological deficits    Labs reviewed, December 10, 2021  Recent Results (from the past 24 hour(s))   C. DIFFICILE AG & TOXIN A/B    Collection Time: 12/09/21 11:11 AM   Result Value Ref Range    PCR Reflex See Reflex order for C. difficile (DNA)      INTERPRETATION (A) NTXCD       Indeterminate for Toxigenic C. difficile, DNA confirmation to follow.    CBC WITH AUTOMATED DIFF    Collection Time: 12/09/21 11:11 AM   Result Value Ref Range    WBC 13.6 (H) 4.1 - 11.1 K/uL    RBC 4.73 4.10 - 5.70 M/uL    HGB 14.4 12.1 - 17.0 g/dL    HCT 41.6 36.6 - 50.3 %    MCV 87.9 80.0 - 99.0 FL    MCH 30.4 26.0 - 34.0 PG    MCHC 34.6 30.0 - 36.5 g/dL    RDW 12.3 11.5 - 14.5 %    PLATELET 004 885 - 928 K/uL    MPV 9.3 8.9 - 12.9 FL    NRBC 0.0 0  WBC    ABSOLUTE NRBC 0.00 0.00 - 0.01 K/uL    NEUTROPHILS 72 32 - 75 %    LYMPHOCYTES 11 (L) 12 - 49 %    MONOCYTES 16 (H) 5 - 13 %    EOSINOPHILS 0 0 - 7 %    BASOPHILS 0 0 - 1 %    IMMATURE GRANULOCYTES 1 (H) 0.0 - 0.5 %    ABS. NEUTROPHILS 9.8 (H) 1.8 - 8.0 K/UL    ABS. LYMPHOCYTES 1.5 0.8 - 3.5 K/UL    ABS. MONOCYTES 2.2 (H) 0.0 - 1.0 K/UL    ABS. EOSINOPHILS 0.0 0.0 - 0.4 K/UL    ABS. BASOPHILS 0.0 0.0 - 0.1 K/UL    ABS. IMM. GRANS. 0.1 (H) 0.00 - 0.04 K/UL    DF SMEAR SCANNED      RBC COMMENTS NORMOCYTIC, NORMOCHROMIC     METABOLIC PANEL, COMPREHENSIVE    Collection Time: 12/09/21 11:11 AM   Result Value Ref Range    Sodium 139 136 - 145 mmol/L    Potassium 3.6 3.5 - 5.1 mmol/L    Chloride 112 (H) 97 - 108 mmol/L    CO2 19 (L) 21 - 32 mmol/L    Anion gap 8 5 - 15 mmol/L    Glucose 99 65 - 100 mg/dL    BUN 11 6 - 20 MG/DL    Creatinine 0.85 0.70 - 1.30 MG/DL    BUN/Creatinine ratio 13 12 - 20      GFR est AA >60 >60 ml/min/1.73m2    GFR est non-AA >60 >60 ml/min/1.73m2    Calcium 8.3 (L) 8.5 - 10.1 MG/DL    Bilirubin, total 0.5 0.2 - 1.0 MG/DL    ALT (SGPT) 18 12 - 78 U/L    AST (SGOT) 11 (L) 15 - 37 U/L    Alk.  phosphatase 68 45 - 117 U/L    Protein, total 6.4 6.4 - 8.2 g/dL    Albumin 2.8 (L) 3.5 - 5.0 g/dL    Globulin 3.6 2.0 - 4.0 g/dL    A-G Ratio 0.8 (L) 1.1 - 2.2     C. DIFFICILE (DNA)    Collection Time: 12/09/21 11:11 AM   Result Value Ref Range    C. difficile (DNA) Positive (AA) NEG     METABOLIC PANEL, COMPREHENSIVE    Collection Time: 12/10/21  4:29 AM   Result Value Ref Range    Sodium 142 136 - 145 mmol/L    Potassium 3.6 3.5 - 5.1 mmol/L    Chloride 114 (H) 97 - 108 mmol/L    CO2 21 21 - 32 mmol/L    Anion gap 7 5 - 15 mmol/L    Glucose 98 65 - 100 mg/dL    BUN 9 6 - 20 MG/DL    Creatinine 0.77 0.70 - 1.30 MG/DL    BUN/Creatinine ratio 12 12 - 20      GFR est AA >60 >60 ml/min/1.73m2    GFR est non-AA >60 >60 ml/min/1.73m2    Calcium 7.9 (L) 8.5 - 10.1 MG/DL    Bilirubin, total 0.4 0.2 - 1.0 MG/DL    ALT (SGPT) 20 12 - 78 U/L    AST (SGOT) 13 (L) 15 - 37 U/L    Alk. phosphatase 58 45 - 117 U/L    Protein, total 5.6 (L) 6.4 - 8.2 g/dL    Albumin 2.4 (L) 3.5 - 5.0 g/dL    Globulin 3.2 2.0 - 4.0 g/dL    A-G Ratio 0.8 (L) 1.1 - 2.2     MAGNESIUM    Collection Time: 12/10/21  4:29 AM   Result Value Ref Range    Magnesium 1.9 1.6 - 2.4 mg/dL   CBC WITH AUTOMATED DIFF    Collection Time: 12/10/21  4:29 AM   Result Value Ref Range    WBC 8.7 4.1 - 11.1 K/uL    RBC 4.26 4.10 - 5.70 M/uL    HGB 12.5 12.1 - 17.0 g/dL    HCT 36.6 36.6 - 50.3 %    MCV 85.9 80.0 - 99.0 FL    MCH 29.3 26.0 - 34.0 PG    MCHC 34.2 30.0 - 36.5 g/dL    RDW 12.5 11.5 - 14.5 %    PLATELET 536 239 - 915 K/uL    MPV 9.9 8.9 - 12.9 FL    NRBC 0.0 0  WBC    ABSOLUTE NRBC 0.00 0.00 - 0.01 K/uL    NEUTROPHILS 59 32 - 75 %    LYMPHOCYTES 23 12 - 49 %    MONOCYTES 16 (H) 5 - 13 %    EOSINOPHILS 2 0 - 7 %    BASOPHILS 0 0 - 1 %    IMMATURE GRANULOCYTES 0 0.0 - 0.5 %    ABS. NEUTROPHILS 5.1 1.8 - 8.0 K/UL    ABS. LYMPHOCYTES 2.0 0.8 - 3.5 K/UL    ABS. MONOCYTES 1.4 (H) 0.0 - 1.0 K/UL    ABS. EOSINOPHILS 0.2 0.0 - 0.4 K/UL    ABS. BASOPHILS 0.0 0.0 - 0.1 K/UL    ABS. IMM. GRANS. 0.0 0.00 - 0.04 K/UL    DF AUTOMATED         Imaging:  CT Results  (Last 48 hours)               12/09/21 0041  CT ABD PELV W CONT Final result    Impression:      1. Left pyelonephritis. 2. Left nephroureteral stent in place. Narrative:  EXAM: CT ABD PELV W CONT       INDICATION: Fever and status post lithotripsy with stent placement       COMPARISON: 2015        CONTRAST: 100 mL of Isovue-370.        TECHNIQUE:    Following the uneventful intravenous administration of contrast, thin axial   images were obtained through the abdomen and pelvis. Coronal and sagittal   reconstructions were generated. Oral contrast was not administered. CT dose   reduction was achieved through use of a standardized protocol tailored for this   examination and automatic exposure control for dose modulation. FINDINGS:    LOWER THORAX: No significant abnormality in the incidentally imaged lower chest.   LIVER: Tiny hypodensity in the liver is too small to characterize. BILIARY TREE: Gallbladder is within normal limits. CBD is not dilated. SPLEEN: within normal limits. PANCREAS: No mass or ductal dilatation. ADRENALS: Unremarkable. KIDNEYS: Air in the left renal collecting system. Left nephrolithiasis. Left   nephroureteral stent in place. Multiple areas of decreased peripheral   enhancement in the left kidney. Left perinephric stranding. STOMACH: Unremarkable. SMALL BOWEL: No dilatation or wall thickening. COLON: Fluid in the colon. APPENDIX: Normal.   PERITONEUM: No ascites or pneumoperitoneum. RETROPERITONEUM: No lymphadenopathy or aortic aneurysm. REPRODUCTIVE ORGANS: Unremarkable. URINARY BLADDER: No mass or calculus. BONES: Prior surgery to the sacrum. ABDOMINAL WALL: No mass or hernia.    ADDITIONAL COMMENTS: N/A                 Signed By: Tasia Razo NP - December 10, 2021

## 2021-12-10 NOTE — PROGRESS NOTES
[facility]    Medical Progress Note      NAME: Darren Driver   :  1990  MRM:  [id number]    Date/Time of service 12/10/2021  12:59 PM          Assessment and Plan:     Pyelonephritis S/P cystoscopy / Kidney stone - POA, due to recent stent placement. Urology consulted and plan no procedure. Cx with only mixed skyla. Stop ceftriaxone and vanco and DC home on Keflex. Monitor cx. Hold NSAIDS    Sepsis / Leukocytosis / Fever / Sinus tachycardia - POA, presumed due to pyelo. Monitor Cx. Supportive care    C diff - C diff detected in stool. No overt signs of colitis, no pain, no fever, no diarrhea. Since he is on Abx, will DC home on PO vancomycin for 1 week. Hx Seizure - continue keppra, zonisamide. Hold eliquis       Subjective:     Chief Complaint:  Feels better, fever gone    ROS:  (bold if positive, if negative)    Tolerating some PT  Tolerating Diet        Objective:     Last 24hrs VS reviewed since prior progress note.  Most recent are:    Visit Vitals  /66   Pulse 80   Temp 98.8 °F (37.1 °C)   Resp 19   Ht 6' 2\" (1.88 m)   Wt 99.8 kg (220 lb)   SpO2 100%   BMI 28.25 kg/m²     SpO2 Readings from Last 6 Encounters:   21 100%   20 99%   16 98%   05/25/15 94%   14 95%   10/02/13 100%            Intake/Output Summary (Last 24 hours) at 12/10/2021 1046  Last data filed at 2021 1550  Gross per 24 hour   Intake 1254 ml   Output 800 ml   Net 454 ml        Physical Exam:    Gen:  Well-developed, well-nourished, in no acute distress  HEENT:  Pink conjunctivae, PERRL, hearing intact to voice, moist mucous membranes  Neck:  Supple, without masses, thyroid non-tender  Resp:  No accessory muscle use, clear breath sounds without wheezes rales or rhonchi  Card:  No murmurs, tachycardic S1, S2 without thrills, bruits or peripheral edema  Abd:  Soft, non-tender, non-distended, normoactive bowel sounds are present, no mass  Lymph:  No cervical or inguinal adenopathy  Musc:  No cyanosis or clubbing  Skin:  No rashes or ulcers, skin turgor is good  Neuro:  Cranial nerves are grossly intact, no focal motor weakness, follows commands appropriately  Psych:  Good insight, oriented to person, place and time, alert    Telemetry reviewed:   normal sinus rhythm  __________________________________________________________________  Medications Reviewed: (see below)  Medications:     Current Facility-Administered Medications   Medication Dose Route Frequency    cephALEXin (KEFLEX) capsule 500 mg  500 mg Oral Q6H    sodium chloride (NS) flush 5-10 mL  5-10 mL IntraVENous PRN    sodium chloride (NS) flush 5-40 mL  5-40 mL IntraVENous Q8H    sodium chloride (NS) flush 5-40 mL  5-40 mL IntraVENous PRN    acetaminophen (TYLENOL) tablet 650 mg  650 mg Oral Q6H PRN    Or    acetaminophen (TYLENOL) suppository 650 mg  650 mg Rectal Q6H PRN    polyethylene glycol (MIRALAX) packet 17 g  17 g Oral DAILY PRN    lactated Ringers infusion  125 mL/hr IntraVENous CONTINUOUS    L.acidophilus-paracasei-S.thermophil-bifidobacter (RISAQUAD) 8 billion cell capsule  1 Capsule Oral DAILY    zonisamide (ZONEGRAN) capsule 200 mg  200 mg Oral DAILY    enoxaparin (LOVENOX) injection 40 mg  40 mg SubCUTAneous Q24H    HYDROmorphone (DILAUDID) tablet 1 mg  1 mg Oral Q4H PRN    naloxone (NARCAN) injection 0.4 mg  0.4 mg IntraVENous EVERY 2 MINUTES AS NEEDED    ondansetron (ZOFRAN) injection 4 mg  4 mg IntraVENous Q6H PRN    levETIRAcetam (KEPPRA) tablet 750 mg  750 mg Oral Q12H    zonisamide (ZONEGRAN) capsule 100 mg  100 mg Oral QPM    vancomycin (FIRVANQ) 50 mg/mL oral solution 125 mg  125 mg Oral Q6H     Current Outpatient Medications   Medication Sig    levETIRAcetam (KEPPRA) 750 mg tablet Take 750 mg by mouth every twelve (12) hours.  tadalafiL (CIALIS) 5 mg tablet Take 5 mg by mouth daily.  zonisamide (ZONEGRAN) 100 mg capsule Take 200 mg by mouth daily.     zonisamide (ZONEGRAN) 100 mg capsule Take 100 mg by mouth every evening.  EPINEPHrine (EpiPen) 0.3 mg/0.3 mL injection 0.3 mg by IntraMUSCular route as needed for Allergic Response.  midazolam 5 mg/spray (0.1 mL) spry by Nasal route as needed (breakthrough seizure).  apixaban (ELIQUIS) 2.5 mg tablet Take 2.5 mg by mouth every twelve (12) hours.  acetaminophen (TYLENOL) 500 mg tablet Take 1,000 mg by mouth every six (6) hours as needed for Fever.  ciprofloxacin HCl (CIPRO) 500 mg tablet Take 500 mg by mouth two (2) times a day.  ketorolac (TORADOL) 10 mg tablet Take 10 mg by mouth every six (6) hours as needed for Pain.  ondansetron hcl (ZOFRAN) 8 mg tablet Take 8 mg by mouth every eight (8) hours as needed for Nausea or Vomiting. Lab Data Reviewed: (see below)  Lab Review:     Recent Labs     12/10/21  0429 12/09/21  1111 12/08/21  2245   WBC 8.7 13.6* 18.1*   HGB 12.5 14.4 16.2   HCT 36.6 41.6 46.3    202 246     Recent Labs     12/10/21  0429 12/09/21  1111 12/08/21  2245    139 134*   K 3.6 3.6 3.2*   * 112* 105   CO2 21 19* 20*   GLU 98 99 121*   BUN 9 11 11   CREA 0.77 0.85 1.33*   CA 7.9* 8.3* 8.8   MG 1.9  --  1.8   ALB 2.4* 2.8* 3.6   TBILI 0.4 0.5 0.9   ALT 20 18 23     No results found for: GLUCPOC  No results for input(s): PH, PCO2, PO2, HCO3, FIO2 in the last 72 hours. No results for input(s): INR, INREXT, INREXT in the last 72 hours. All Micro Results     Procedure Component Value Units Date/Time    CULTURE, URINE [[id number]] Collected: 12/08/21 2245    Order Status: Completed Specimen: Urine from Clean catch Updated: 12/10/21 0759     Special Requests: NO SPECIAL REQUESTS        [place] Count --        74020  COLONIES/mL       Culture result:       >2 ORGANISMS - CONTAMINATED SPECIMEN.  SUGGEST RECOLLECTION          CULTURE, BLOOD, PAIRED [[id number]] Collected: 12/08/21 4056    Order Status: Completed Specimen: Blood Updated: 12/10/21 0536     Special Requests: NO SPECIAL REQUESTS        Culture result: NO GROWTH 2 DAYS       C. DIFFICILE AG & TOXIN A/B [[id number]]  (Abnormal) Collected: 12/09/21 1111    Order Status: Completed Specimen: Stool Updated: 12/09/21 2040     PCR Reflex       See Reflex order for C. difficile (DNA)           INTERPRETATION       Indeterminate for Toxigenic C. difficile, DNA confirmation to follow. C. DIFFICILE (DNA) [[id number]]  (Abnormal) Collected: 12/09/21 1111    Order Status: Completed Specimen: Stool Updated: 12/09/21 2040     C. difficile (DNA) Positive        Comment: CALLED TO AND READ BACK BY  Rodríguez Bennett RN ON 12/9/21 AT 2040. MS  This specimen is positive for toxigenic C difficile by DNA amplification. Repeat testing is not recommended, samples received within 7 days of this positive result will be rejected. Assay is not approved to test for cure, since nucleic acids may persist after effective treatment and may prompt false positive results. CULTURE, URINE [[id number]] Collected: 12/09/21 0015    Order Status: Canceled Specimen: Urine from Clean catch     COVID-19 RAPID TEST [[id number]] Collected: 12/08/21 2252    Order Status: Completed Specimen: Nasopharyngeal Updated: 12/08/21 2348     Specimen source Nasopharyngeal        COVID-19 rapid test Not detected        Comment: Rapid Abbott ID Now       Rapid NAAT:  The specimen is NEGATIVE for SARS-CoV-2, the novel coronavirus associated with COVID-19. Negative results should be treated as presumptive and, if inconsistent with clinical signs and symptoms or necessary for patient management, should be tested with an alternative molecular assay. Negative results do not preclude SARS-CoV-2 infection and should not be used as the sole basis for patient management decisions. This test has been authorized by the FDA under an Emergency Use Authorization (EUA) for use by authorized laboratories.    Fact sheet for Healthcare Providers: ConventionUpdate.co.nz  Fact sheet for Patients: ConventionUpdate.co.nz       Methodology: Isothermal Nucleic Acid Amplification         CULTURE, URINE [[id number]]     Order Status: Canceled Specimen: Urine from Clean catch           Other pertinent lab: none    Total time spent with patient: 45 Minutes I personally rounded from 3:25 - 4:10, in addition to the time spent by my partner, Dr Isha Werner, earlier today. I personally reviewed chart, notes, data and current medications in the medical record. I have personally examined and treated the patient at bedside during this period. I personally made additional diagnoses, placed additional orders and had additional discussions.                  Care Plan discussed with: Patient, Family, Nursing Staff and >50% of time spent in counseling and coordination of care    Discussed:  Care Plan and D/C Planning    Prophylaxis:  H2B/PPI    Disposition:  Home w/Family           ___________________________________________________    Attending Physician: Breanna Ward MD

## 2021-12-10 NOTE — PROGRESS NOTES
Problem: Risk for Spread of Infection  Goal: Prevent transmission of infectious organism to others  Description: Prevent the transmission of infectious organisms to other patients, staff members, and visitors. 12/10/2021 1708 by Krystal Morrison RN  Outcome: Resolved/Met  12/10/2021 1708 by Krystal Morrison RN  Outcome: Resolved/Not Met     Problem: Patient Education:  Go to Education Activity  Goal: Patient/Family Education  12/10/2021 1708 by Krystal Morrison RN  Outcome: Resolved/Met  12/10/2021 1708 by Krystal Morrison RN  Outcome: Resolved/Not Met     Problem: Falls - Risk of  Goal: *Absence of Falls  Description: Document Rhona Zhou Fall Risk and appropriate interventions in the flowsheet.   12/10/2021 1708 by Krystal Morrison RN  Outcome: Resolved/Met  12/10/2021 1708 by Krystal Morrison RN  Outcome: Resolved/Not Met  Note: Fall Risk Interventions:            Medication Interventions: Teach patient to arise slowly    Elimination Interventions: Call light in reach              Problem: Patient Education: Go to Patient Education Activity  Goal: Patient/Family Education  12/10/2021 1708 by Krystal Morrison RN  Outcome: Resolved/Met  12/10/2021 1708 by Krystal Morrison RN  Outcome: Resolved/Not Met     Problem: General Medical Care Plan  Goal: *Vital signs within specified parameters  12/10/2021 1708 by Krystal Morrison RN  Outcome: Resolved/Met  12/10/2021 1708 by Krystal Morrison RN  Outcome: Resolved/Not Met  Goal: *Labs within defined limits  12/10/2021 1708 by Krystal Morrison RN  Outcome: Resolved/Met  12/10/2021 1708 by Krystal Morrison RN  Outcome: Resolved/Not Met  Goal: *Absence of infection signs and symptoms  12/10/2021 1708 by Krystal Morrison RN  Outcome: Resolved/Met  12/10/2021 1708 by Krystal Morrison RN  Outcome: Resolved/Not Met  Goal: *Optimal pain control at patient's stated goal  12/10/2021 1708 by Krystal Morrison RN  Outcome: Resolved/Met  12/10/2021 1708 by Melisa Moyer, RN  Outcome: Resolved/Not Met  Goal: *Skin integrity maintained  12/10/2021 1708 by Melisa Moyer RN  Outcome: Resolved/Met  12/10/2021 1708 by Melisa Moyer RN  Outcome: Resolved/Not Met  Goal: *Fluid volume balance  12/10/2021 1708 by Melisa Moyer RN  Outcome: Resolved/Met  12/10/2021 1708 by Melisa Moyer RN  Outcome: Resolved/Not Met  Goal: *Optimize nutritional status  12/10/2021 1708 by Melisa Moyer RN  Outcome: Resolved/Met  12/10/2021 1708 by Melisa Moyer RN  Outcome: Resolved/Not Met  Goal: *Anxiety reduced or absent  12/10/2021 1708 by Melisa Moyer RN  Outcome: Resolved/Met  12/10/2021 1708 by Melisa Moyer RN  Outcome: Resolved/Not Met  Goal: *Progressive mobility and function (eg: ADL's)  12/10/2021 1708 by Melisa Moyer RN  Outcome: Resolved/Met  12/10/2021 1708 by Melisa Moyer RN  Outcome: Resolved/Not Met     Problem: Patient Education: Go to Patient Education Activity  Goal: Patient/Family Education  12/10/2021 1708 by Melisa Moyer RN  Outcome: Resolved/Met  12/10/2021 1708 by eMlisa Moyer RN  Outcome: Resolved/Not Met

## 2021-12-10 NOTE — DISCHARGE INSTRUCTIONS
Patient Discharge Instructions    Lorena Craig / [id number] : 1990    Admitted 2021 Discharged: 12/10/2021     Primary Diagnoses  Problem List as of 12/10/2021 Date Reviewed: 2021           Seizure ([facility] [address]   Kidney stone   * (Principal) Pyelonephritis   Sepsis ([facility] [address]   Leukocytosis   Fever   Sinus tachycardia   S/P cystoscopy          Take Home Medications     · It is important that you take the medication exactly as they are prescribed. · Keep your medication in the bottles provided by the pharmacist and keep a list of the medication names, dosages, and times to be taken in your wallet. · Do not take other medications without consulting your doctor. What to do at Home    Recommended diet: Regular Diet    Recommended activity: Activity as tolerated    If you experience worse symptoms, please follow up with urology or your PCP. Follow-up with your PCP in a few weeks    Follow-up Information     Follow up With Specialties Details Why [address] [facility]  On 2021 Dr. Borden Million at 2:50pm [facility] [address]    Almas Morrison NP Nurse Practitioner Schedule an appointment as soon as possible for a visit in 1 week  [address]  [phone]             Information obtained by :  I understand that if any problems occur once I am at home I am to contact my physician. I understand and acknowledge receipt of the instructions indicated above.                                                                                                                                            Physician's or R.N.'s Signature                                                                  Date/Time                                                                                                                                              Patient or Representative Signature Date/Time

## 2021-12-10 NOTE — DISCHARGE SUMMARY
Physician Discharge Summary     Patient ID:  Mary Sam  [id number]  50 y.o.  1990    Admit date: 12/8/2021    Discharge date of service and time: 12/10/2021    Admission Diagnoses: Sepsis [facility]) [A41.9]    Discharge Diagnoses:    Principal Diagnosis   Pyelonephritis                                             Hospital Course and other diagnoses  Pyelonephritis S/P cystoscopy / Kidney stone - POA, due to recent stent placement. Urology consulted and plan no procedure. Cx with only mixed skyla. Stop ceftriaxone and vanco and DC home on Keflex. Monitor cx. Hold NSAIDS     Sepsis / Leukocytosis / Fever / Sinus tachycardia - POA, presumed due to pyelo. Monitor Cx. Supportive care     C diff - C diff detected in stool. No overt signs of colitis, no pain, no fever, no diarrhea. Since he is on Abx, will DC home on PO vancomycin for 1 week.     Hx Seizure - continue keppra, zonisamide. Hold eliquis     PCP: Iqra Penaloza NP    Consults: Urology    Significant Diagnostic Studies: See Hospital Course    Discharged home in improved condition.     Discharge Exam:  /66   Pulse 80   Temp 98.8 °F (37.1 °C)   Resp 19   Ht 6' 2\" (1.88 m)   Wt 99.8 kg (220 lb)   SpO2 100%   BMI 28.25 kg/m²      Gen:  Well-developed, well-nourished, in no acute distress  HEENT:  Pink conjunctivae, PERRL, hearing intact to voice, moist mucous membranes  Neck:  Supple, without masses, thyroid non-tender  Resp:  No accessory muscle use, clear breath sounds without wheezes rales or rhonchi  Card:  No murmurs, tachycardic S1, S2 without thrills, bruits or peripheral edema  Abd:  Soft, non-tender, non-distended, normoactive bowel sounds are present, no mass  Lymph:  No cervical or inguinal adenopathy  Musc:  No cyanosis or clubbing  Skin:  No rashes or ulcers, skin turgor is good  Neuro:  Cranial nerves are grossly intact, no focal motor weakness, follows commands appropriately  Psych:  Good insight, oriented to person, place and time, alert    Patient Instructions:   Current Discharge Medication List      START taking these medications    Details   cephALEXin (KEFLEX) 500 mg capsule Take 1 Capsule by mouth every six (6) hours for 7 days. Qty: 28 Capsule, Refills: 0      L.acid,para-B. bifidum-S.therm (RISAQUAD) 8 billion cell cap cap Take 1 Capsule by mouth daily for 30 days. Can substitute any probiotics  Qty: 30 Capsule, Refills: 0      vancomycin (FIRVANQ) 50 mg/mL oral solution Take 2.5 mL by mouth every six (6) hours for 7 days. Qty: 70 mL, Refills: 0         CONTINUE these medications which have NOT CHANGED    Details   levETIRAcetam (KEPPRA) 750 mg tablet Take 750 mg by mouth every twelve (12) hours. tadalafiL (CIALIS) 5 mg tablet Take 5 mg by mouth daily. !! zonisamide (ZONEGRAN) 100 mg capsule Take 200 mg by mouth daily. !! zonisamide (ZONEGRAN) 100 mg capsule Take 100 mg by mouth every evening. EPINEPHrine (EpiPen) 0.3 mg/0.3 mL injection 0.3 mg by IntraMUSCular route as needed for Allergic Response. midazolam 5 mg/spray (0.1 mL) spry by Nasal route as needed (breakthrough seizure). apixaban (ELIQUIS) 2.5 mg tablet Take 2.5 mg by mouth every twelve (12) hours. acetaminophen (TYLENOL) 500 mg tablet Take 1,000 mg by mouth every six (6) hours as needed for Fever. ketorolac (TORADOL) 10 mg tablet Take 10 mg by mouth every six (6) hours as needed for Pain. ondansetron hcl (ZOFRAN) 8 mg tablet Take 8 mg by mouth every eight (8) hours as needed for Nausea or Vomiting. !! - Potential duplicate medications found. Please discuss with provider. STOP taking these medications       ciprofloxacin HCl (CIPRO) 500 mg tablet Comments:   Reason for Stopping:             Activity: Activity as tolerated  Diet: Regular Diet  Wound Care: None needed    Follow-up with your PCP and urology in a few weeks.   Follow-up tests/labs - none    Signed:  Priya Cortes MD  12/10/2021  2:56 PM

## 2021-12-10 NOTE — PROGRESS NOTES
12/10/2021  2:09 PM  Care Management Progress Note      ICD-10-CM ICD-9-CM    1. Acute febrile illness  R50.9 780.60    2. Hypokalemia  E87.6 276.8    3. Pyelonephritis of left kidney  N12 590.80    4. Severe sepsis (HCC)  A41.9 038.9     R65.20 995.92        RUR:  7%  Risk Level: [x]Low []Moderate []High  Value-based purchasing: [] Yes [x] No  Bundle patient: [] Yes [x] No   Specify:     Transition of care plan:  1. Awaiting medical clearance and DC order. Urology following. Cultures pending. 2. Home with family assistance. 3. Outpatient follow-up. 4. Pt's family to transport.

## 2021-12-13 LAB
BACTERIA SPEC CULT: NORMAL
SERVICE CMNT-IMP: NORMAL

## 2021-12-13 RX ORDER — CEPHALEXIN 500 MG/1
500 CAPSULE ORAL EVERY 6 HOURS
Qty: 28 CAPSULE | Refills: 0 | OUTPATIENT
Start: 2021-12-13 | End: 2021-12-13 | Stop reason: SDUPTHER

## 2021-12-13 RX ORDER — CEPHALEXIN 500 MG/1
500 CAPSULE ORAL EVERY 6 HOURS
Qty: 28 CAPSULE | Refills: 0 | OUTPATIENT
Start: 2021-12-13 | End: 2021-12-20